# Patient Record
Sex: MALE | Race: WHITE | NOT HISPANIC OR LATINO | Employment: FULL TIME | URBAN - METROPOLITAN AREA
[De-identification: names, ages, dates, MRNs, and addresses within clinical notes are randomized per-mention and may not be internally consistent; named-entity substitution may affect disease eponyms.]

---

## 2023-01-28 PROBLEM — Z91.09 ALLERGY TO MOLD SPORES: Status: ACTIVE | Noted: 2023-01-28

## 2023-01-28 PROBLEM — M72.2 PLANTAR FASCIITIS, LEFT: Status: ACTIVE | Noted: 2023-01-28

## 2023-01-28 PROBLEM — J30.1 ALLERGIC RHINITIS DUE TO POLLEN: Status: ACTIVE | Noted: 2023-01-28

## 2023-01-28 PROBLEM — K21.9 ESOPHAGEAL REFLUX: Status: ACTIVE | Noted: 2023-01-28

## 2023-01-28 PROBLEM — E66.812 CLASS 2 SEVERE OBESITY DUE TO EXCESS CALORIES WITH SERIOUS COMORBIDITY AND BODY MASS INDEX (BMI) OF 37.0 TO 37.9 IN ADULT: Status: ACTIVE | Noted: 2023-01-28

## 2023-01-28 PROBLEM — H69.93 DYSFUNCTION OF BOTH EUSTACHIAN TUBES: Status: ACTIVE | Noted: 2023-01-28

## 2023-01-28 PROBLEM — E78.5 DYSLIPIDEMIA: Status: ACTIVE | Noted: 2023-01-28

## 2023-01-28 PROBLEM — E66.01 CLASS 2 SEVERE OBESITY DUE TO EXCESS CALORIES WITH SERIOUS COMORBIDITY AND BODY MASS INDEX (BMI) OF 37.0 TO 37.9 IN ADULT (MULTI): Status: ACTIVE | Noted: 2023-01-28

## 2023-01-28 PROBLEM — L30.9 ECZEMA OF BOTH HANDS: Status: ACTIVE | Noted: 2023-01-28

## 2023-01-28 PROBLEM — J45.909 ALLERGIC ASTHMA (HHS-HCC): Status: ACTIVE | Noted: 2023-01-28

## 2023-01-28 PROBLEM — R74.01 ALT (SGPT) LEVEL RAISED: Status: ACTIVE | Noted: 2023-01-28

## 2023-01-28 PROBLEM — M72.2 PLANTAR FASCIITIS, RIGHT: Status: ACTIVE | Noted: 2023-01-28

## 2023-01-28 PROBLEM — J30.81 ALLERGY TO ANIMAL DANDER: Status: ACTIVE | Noted: 2023-01-28

## 2023-01-28 PROBLEM — L91.8 SKIN TAG: Status: ACTIVE | Noted: 2023-01-28

## 2023-01-28 PROBLEM — F90.0 ADHD, PREDOMINANTLY INATTENTIVE TYPE: Status: ACTIVE | Noted: 2023-01-28

## 2023-01-28 PROBLEM — I10 ESSENTIAL HYPERTENSION WITH GOAL BLOOD PRESSURE LESS THAN 130/85: Status: ACTIVE | Noted: 2023-01-28

## 2023-01-28 RX ORDER — LOSARTAN POTASSIUM 100 MG/1
1 TABLET ORAL DAILY
COMMUNITY
Start: 2021-09-09 | End: 2023-06-19

## 2023-01-28 RX ORDER — CETIRIZINE HYDROCHLORIDE 10 MG/1
1 TABLET ORAL NIGHTLY
COMMUNITY
Start: 2015-07-24

## 2023-01-28 RX ORDER — METHYLPHENIDATE HYDROCHLORIDE 27 MG/1
TABLET ORAL
COMMUNITY
Start: 2022-10-19 | End: 2024-03-21 | Stop reason: SDUPTHER

## 2023-01-28 RX ORDER — ALBUTEROL SULFATE 90 UG/1
2 AEROSOL, METERED RESPIRATORY (INHALATION) EVERY 4 HOURS PRN
COMMUNITY
Start: 2019-03-19

## 2023-01-28 RX ORDER — MONTELUKAST SODIUM 10 MG/1
1 TABLET ORAL NIGHTLY
COMMUNITY
Start: 2016-10-18 | End: 2024-05-31

## 2023-03-16 ENCOUNTER — OFFICE VISIT (OUTPATIENT)
Dept: PRIMARY CARE | Facility: CLINIC | Age: 33
End: 2023-03-16
Payer: COMMERCIAL

## 2023-03-16 ENCOUNTER — LAB (OUTPATIENT)
Dept: LAB | Facility: LAB | Age: 33
End: 2023-03-16
Payer: COMMERCIAL

## 2023-03-16 VITALS
OXYGEN SATURATION: 97 % | DIASTOLIC BLOOD PRESSURE: 90 MMHG | SYSTOLIC BLOOD PRESSURE: 164 MMHG | BODY MASS INDEX: 38.86 KG/M2 | RESPIRATION RATE: 16 BRPM | TEMPERATURE: 98.1 F | HEART RATE: 75 BPM | HEIGHT: 73 IN | WEIGHT: 293.2 LBS

## 2023-03-16 DIAGNOSIS — E78.5 DYSLIPIDEMIA: ICD-10-CM

## 2023-03-16 DIAGNOSIS — I10 ESSENTIAL HYPERTENSION WITH GOAL BLOOD PRESSURE LESS THAN 130/85: Primary | Chronic | ICD-10-CM

## 2023-03-16 DIAGNOSIS — J45.20 MILD INTERMITTENT EXTRINSIC ASTHMA WITHOUT COMPLICATION (HHS-HCC): Chronic | ICD-10-CM

## 2023-03-16 DIAGNOSIS — I10 ESSENTIAL HYPERTENSION WITH GOAL BLOOD PRESSURE LESS THAN 130/85: Chronic | ICD-10-CM

## 2023-03-16 DIAGNOSIS — A05.8: ICD-10-CM

## 2023-03-16 DIAGNOSIS — F90.0 ADHD, PREDOMINANTLY INATTENTIVE TYPE: Chronic | ICD-10-CM

## 2023-03-16 DIAGNOSIS — K21.9 GASTROESOPHAGEAL REFLUX DISEASE WITHOUT ESOPHAGITIS: Chronic | ICD-10-CM

## 2023-03-16 DIAGNOSIS — J30.1 SEASONAL ALLERGIC RHINITIS DUE TO POLLEN: Chronic | ICD-10-CM

## 2023-03-16 DIAGNOSIS — Z56.6 STRESS AT WORK: Chronic | ICD-10-CM

## 2023-03-16 LAB
ALANINE AMINOTRANSFERASE (SGPT) (U/L) IN SER/PLAS: 44 U/L (ref 10–52)
ALBUMIN (G/DL) IN SER/PLAS: 4.3 G/DL (ref 3.4–5)
ALKALINE PHOSPHATASE (U/L) IN SER/PLAS: 78 U/L (ref 33–120)
ANION GAP IN SER/PLAS: 12 MMOL/L (ref 10–20)
ASPARTATE AMINOTRANSFERASE (SGOT) (U/L) IN SER/PLAS: 32 U/L (ref 9–39)
BILIRUBIN TOTAL (MG/DL) IN SER/PLAS: 0.4 MG/DL (ref 0–1.2)
CALCIUM (MG/DL) IN SER/PLAS: 9.3 MG/DL (ref 8.6–10.3)
CARBON DIOXIDE, TOTAL (MMOL/L) IN SER/PLAS: 25 MMOL/L (ref 21–32)
CHLORIDE (MMOL/L) IN SER/PLAS: 104 MMOL/L (ref 98–107)
CHOLESTEROL (MG/DL) IN SER/PLAS: 213 MG/DL (ref 0–199)
CHOLESTEROL IN HDL (MG/DL) IN SER/PLAS: 41.2 MG/DL
CHOLESTEROL/HDL RATIO: 5.2
CREATININE (MG/DL) IN SER/PLAS: 0.87 MG/DL (ref 0.5–1.3)
GFR MALE: >90 ML/MIN/1.73M2
GLUCOSE (MG/DL) IN SER/PLAS: 103 MG/DL (ref 74–99)
LDL: 126 MG/DL (ref 0–99)
NON HDL CHOLESTEROL: 172 MG/DL
POTASSIUM (MMOL/L) IN SER/PLAS: 4.1 MMOL/L (ref 3.5–5.3)
PROTEIN TOTAL: 7.3 G/DL (ref 6.4–8.2)
SODIUM (MMOL/L) IN SER/PLAS: 137 MMOL/L (ref 136–145)
THYROTROPIN (MIU/L) IN SER/PLAS BY DETECTION LIMIT <= 0.05 MIU/L: 1.65 MIU/L (ref 0.44–3.98)
TRIGLYCERIDE (MG/DL) IN SER/PLAS: 229 MG/DL (ref 0–149)
UREA NITROGEN (MG/DL) IN SER/PLAS: 13 MG/DL (ref 6–23)
VLDL: 46 MG/DL (ref 0–40)

## 2023-03-16 PROCEDURE — 3077F SYST BP >= 140 MM HG: CPT | Performed by: INTERNAL MEDICINE

## 2023-03-16 PROCEDURE — 3080F DIAST BP >= 90 MM HG: CPT | Performed by: INTERNAL MEDICINE

## 2023-03-16 PROCEDURE — 99214 OFFICE O/P EST MOD 30 MIN: CPT | Performed by: INTERNAL MEDICINE

## 2023-03-16 PROCEDURE — 36415 COLL VENOUS BLD VENIPUNCTURE: CPT

## 2023-03-16 PROCEDURE — 84443 ASSAY THYROID STIM HORMONE: CPT

## 2023-03-16 PROCEDURE — 1036F TOBACCO NON-USER: CPT | Performed by: INTERNAL MEDICINE

## 2023-03-16 PROCEDURE — 80053 COMPREHEN METABOLIC PANEL: CPT

## 2023-03-16 PROCEDURE — 80061 LIPID PANEL: CPT

## 2023-03-16 RX ORDER — KETOCONAZOLE 20 MG/G
CREAM TOPICAL
COMMUNITY
Start: 2022-04-12

## 2023-03-16 RX ORDER — AZITHROMYCIN 500 MG/1
500 TABLET, FILM COATED ORAL DAILY
Qty: 3 TABLET | Refills: 0 | COMMUNITY
Start: 2022-09-14 | End: 2022-09-17

## 2023-03-16 RX ORDER — HYDROCORTISONE 25 MG/G
CREAM TOPICAL
COMMUNITY
Start: 2022-04-12

## 2023-03-16 SDOH — HEALTH STABILITY - MENTAL HEALTH: OTHER PHYSICAL AND MENTAL STRAIN RELATED TO WORK: Z56.6

## 2023-03-16 ASSESSMENT — PATIENT HEALTH QUESTIONNAIRE - PHQ9
1. LITTLE INTEREST OR PLEASURE IN DOING THINGS: NOT AT ALL
SUM OF ALL RESPONSES TO PHQ9 QUESTIONS 1 AND 2: 0
2. FEELING DOWN, DEPRESSED OR HOPELESS: NOT AT ALL

## 2023-03-16 ASSESSMENT — ENCOUNTER SYMPTOMS
OCCASIONAL FEELINGS OF UNSTEADINESS: 0
LOSS OF SENSATION IN FEET: 0
DEPRESSION: 0

## 2023-03-16 NOTE — PROGRESS NOTES
"Subjective   Patient ID: Roosevelt Cartagena is a 32 y.o. male who presents for Follow-up.    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  Its been a very stressful 2 weeks.  He has not been able to get to the gym because there is afterschool meetings         Review of Systems    Objective   /90 (BP Location: Left arm, Patient Position: Sitting)   Pulse 75   Temp 36.7 °C (98.1 °F)   Resp 16   Ht 1.854 m (6' 1\")   Wt 133 kg (293 lb 3.2 oz)   SpO2 97%   BMI 38.68 kg/m²     Physical Exam  Vitals reviewed.   Constitutional:       Appearance: Normal appearance. He is obese.   HENT:      Head: Normocephalic and atraumatic.   Eyes:      General: No scleral icterus.        Right eye: No discharge.         Left eye: No discharge.      Extraocular Movements: Extraocular movements intact.      Conjunctiva/sclera: Conjunctivae normal.      Pupils: Pupils are equal, round, and reactive to light.   Cardiovascular:      Rate and Rhythm: Normal rate and regular rhythm.      Pulses: Normal pulses.      Heart sounds: Normal heart sounds. No murmur heard.  Pulmonary:      Effort: Pulmonary effort is normal.      Breath sounds: Normal breath sounds. No wheezing or rhonchi.   Musculoskeletal:         General: No deformity or signs of injury. Normal range of motion.      Cervical back: Normal range of motion and neck supple. No rigidity or tenderness.   Lymphadenopathy:      Cervical: No cervical adenopathy.   Skin:     General: Skin is warm and dry.      Findings: No rash.   Neurological:      General: No focal deficit present.      Mental Status: He is alert and oriented to person, place, and time. Mental status is at baseline.      Cranial Nerves: No cranial nerve deficit.      Sensory: No sensory deficit.      Gait: Gait normal.   Psychiatric:   "       Mood and Affect: Mood normal.         Behavior: Behavior normal.         Thought Content: Thought content normal.         Judgment: Judgment normal.         Assessment/Plan   Problem List Items Addressed This Visit          Respiratory    Allergic asthma       Circulatory    Essential hypertension with goal blood pressure less than 130/85 - Primary    Relevant Orders    Comprehensive Metabolic Panel (Completed)    Follow Up In [facility] - PCP       Digestive    Esophageal reflux       Other    Stress at work (Chronic)    ADHD, predominantly inattentive type    Allergic rhinitis due to pollen    Dyslipidemia    Relevant Orders    Lipid Panel (Completed)    TSH with reflex to Free T4 if abnormal (Completed)     Other Visit Diagnoses       Campylobacter jejuni food poisoning                   Work stress-unfortunately there is been cut back 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  This all came to light within the last week or so.  Its been very stressful involving afternoon meetings.  Support provided.  Fortunately he has been reassured that he will be able to maintain his job    Elevated blood pressure/elevated weight/dyslipidemia-he will continue to follow his blood pressure at home with the machine that found to be fairly accurate.  He will continue exercise plan - presently at [facility] - now 2 x wk     Overweight with elevated BMI over 25- long-term lifestyle measures including optimizing diet and fitness routine are important has been emphasized.  He has utilized calorie counting apps, and understands the importance of balancing optimal eating and aerobic fitness      Asthma has improved presently he remains on montelukast. Mainly needs inhaler before exercise         Allergy plan-he will continue his monthly shots with Dr. Hardin, as well as his medications. Encouraged him not to get another cat  at this time until his symptoms have been stabilized/improved.   Doing well presently. Monthly allergy shots continue. He will continue his annually. last appt 10/22     ADHD- he is back on Concerta- under the direction of Dr. Olvin Lubin at [facility].  Quarterly visits continue     Hx recurrent bilateral plantar fasciitis/feet pain-He's been thru PT, and has seen his podiatrist, Dr. Renee now prn. Inserts from [employer] very helpful.      Coffee intolerance-he will continue avoid this as he seems to have palpitations and chills with this. Interesting negative allergy evaluation for coffee     Acid reflux-not problematic     Snoring/sleeplessness- Reports that he is unable to fall asleep at nights. Snoring after drinking alcohol. Encouraged him to do a sleep study in the past. He will call with concerns. 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 I discussed how his asthma and allergies could also be combined with the eczema changes. He may be evolving towards dyshidrotic eczema picture. We'll review at follow-up. not active issue.     Skin - follows up with Dermatology. Recently saw Dr. Crump for groin skin tag.     Dental care-encouraged semiannual dental visits. last visit 7/22.     Vision appt. -updated every year or so. Will f/u with any vision changes or concerns.

## 2023-03-17 NOTE — RESULT ENCOUNTER NOTE
Roosevelt - thank you for coming in for your visit, and for doing the labs.  I am glad that labs look stable without any worrisome findings.  The kidney, liver and thyroid labs look fine as do the electrolytes.      The fasting glucose is slightly elevated.  Finally the cholesterol panel shows mixed results.  Though the total cholesterol and LDL/bad cholesterol are a bit better, the HDL/good cholesterol and triglyceride level are very worse.  We will continue to follow these down the road.    Wishing you all the best.    Anthony Read MD none known

## 2023-06-17 DIAGNOSIS — I10 ESSENTIAL (PRIMARY) HYPERTENSION: ICD-10-CM

## 2023-06-19 RX ORDER — LOSARTAN POTASSIUM 100 MG/1
TABLET ORAL
Qty: 90 TABLET | Refills: 3 | Status: SHIPPED | OUTPATIENT
Start: 2023-06-19 | End: 2024-02-05

## 2023-09-21 ENCOUNTER — OFFICE VISIT (OUTPATIENT)
Dept: PRIMARY CARE | Facility: CLINIC | Age: 33
End: 2023-09-21
Payer: COMMERCIAL

## 2023-09-21 VITALS
SYSTOLIC BLOOD PRESSURE: 124 MMHG | HEIGHT: 73 IN | RESPIRATION RATE: 16 BRPM | TEMPERATURE: 98.1 F | WEIGHT: 278 LBS | OXYGEN SATURATION: 96 % | BODY MASS INDEX: 36.84 KG/M2 | DIASTOLIC BLOOD PRESSURE: 84 MMHG | HEART RATE: 76 BPM

## 2023-09-21 DIAGNOSIS — Z23 NEED FOR INFLUENZA VACCINATION: ICD-10-CM

## 2023-09-21 DIAGNOSIS — L98.9 SKIN LESION: Primary | ICD-10-CM

## 2023-09-21 PROBLEM — L91.8 OTHER HYPERTROPHIC DISORDERS OF THE SKIN: Status: ACTIVE | Noted: 2022-03-15

## 2023-09-21 PROBLEM — L30.4 ERYTHEMA INTERTRIGO: Status: ACTIVE | Noted: 2022-03-15

## 2023-09-21 PROCEDURE — 3079F DIAST BP 80-89 MM HG: CPT | Performed by: INTERNAL MEDICINE

## 2023-09-21 PROCEDURE — 1036F TOBACCO NON-USER: CPT | Performed by: INTERNAL MEDICINE

## 2023-09-21 PROCEDURE — 90686 IIV4 VACC NO PRSV 0.5 ML IM: CPT | Performed by: INTERNAL MEDICINE

## 2023-09-21 PROCEDURE — 3074F SYST BP LT 130 MM HG: CPT | Performed by: INTERNAL MEDICINE

## 2023-09-21 PROCEDURE — 90471 IMMUNIZATION ADMIN: CPT | Performed by: INTERNAL MEDICINE

## 2023-09-21 PROCEDURE — 99395 PREV VISIT EST AGE 18-39: CPT | Performed by: INTERNAL MEDICINE

## 2023-09-21 ASSESSMENT — ENCOUNTER SYMPTOMS
LOSS OF SENSATION IN FEET: 0
OCCASIONAL FEELINGS OF UNSTEADINESS: 0
DEPRESSION: 0
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
## 2023-10-24 ENCOUNTER — CLINICAL SUPPORT (OUTPATIENT)
Dept: ALLERGY | Facility: CLINIC | Age: 33
End: 2023-10-24
Payer: COMMERCIAL

## 2023-10-24 DIAGNOSIS — J30.1 ALLERGIC RHINITIS DUE TO POLLEN, UNSPECIFIED SEASONALITY: ICD-10-CM

## 2023-10-24 PROCEDURE — 95117 IMMUNOTHERAPY INJECTIONS: CPT | Performed by: ALLERGY & IMMUNOLOGY

## 2023-11-28 ENCOUNTER — CLINICAL SUPPORT (OUTPATIENT)
Dept: ALLERGY | Facility: CLINIC | Age: 33
End: 2023-11-28
Payer: COMMERCIAL

## 2023-11-28 DIAGNOSIS — J30.9 ALLERGIC RHINITIS, UNSPECIFIED SEASONALITY, UNSPECIFIED TRIGGER: ICD-10-CM

## 2023-11-28 PROCEDURE — 95117 IMMUNOTHERAPY INJECTIONS: CPT | Performed by: ALLERGY & IMMUNOLOGY

## 2023-12-19 ENCOUNTER — CLINICAL SUPPORT (OUTPATIENT)
Dept: ALLERGY | Facility: CLINIC | Age: 33
End: 2023-12-19
Payer: COMMERCIAL

## 2023-12-19 DIAGNOSIS — J30.9 ALLERGIC RHINITIS, UNSPECIFIED SEASONALITY, UNSPECIFIED TRIGGER: ICD-10-CM

## 2023-12-19 PROCEDURE — 95117 IMMUNOTHERAPY INJECTIONS: CPT | Performed by: ALLERGY & IMMUNOLOGY

## 2024-01-16 ENCOUNTER — CLINICAL SUPPORT (OUTPATIENT)
Dept: ALLERGY | Facility: CLINIC | Age: 34
End: 2024-01-16
Payer: COMMERCIAL

## 2024-01-16 DIAGNOSIS — J30.9 ALLERGIC RHINITIS, UNSPECIFIED SEASONALITY, UNSPECIFIED TRIGGER: ICD-10-CM

## 2024-01-16 PROCEDURE — 95117 IMMUNOTHERAPY INJECTIONS: CPT | Performed by: ALLERGY & IMMUNOLOGY

## 2024-02-05 ENCOUNTER — OFFICE VISIT (OUTPATIENT)
Dept: PRIMARY CARE | Facility: CLINIC | Age: 34
End: 2024-02-05
Payer: COMMERCIAL

## 2024-02-05 DIAGNOSIS — K21.9 GASTROESOPHAGEAL REFLUX DISEASE WITHOUT ESOPHAGITIS: ICD-10-CM

## 2024-02-05 DIAGNOSIS — F41.9 ANXIETY: ICD-10-CM

## 2024-02-05 DIAGNOSIS — Z56.6 STRESS AT WORK: Chronic | ICD-10-CM

## 2024-02-05 DIAGNOSIS — J45.20 MILD INTERMITTENT EXTRINSIC ASTHMA WITHOUT COMPLICATION (HHS-HCC): ICD-10-CM

## 2024-02-05 DIAGNOSIS — E78.5 DYSLIPIDEMIA: ICD-10-CM

## 2024-02-05 DIAGNOSIS — R10.13 DYSPEPSIA: ICD-10-CM

## 2024-02-05 DIAGNOSIS — J30.1 ALLERGIC RHINITIS DUE TO POLLEN, UNSPECIFIED SEASONALITY: Chronic | ICD-10-CM

## 2024-02-05 DIAGNOSIS — I10 ESSENTIAL HYPERTENSION WITH GOAL BLOOD PRESSURE LESS THAN 130/85: Primary | Chronic | ICD-10-CM

## 2024-02-05 DIAGNOSIS — M72.2 PLANTAR FASCIITIS, RIGHT: ICD-10-CM

## 2024-02-05 PROCEDURE — 1036F TOBACCO NON-USER: CPT | Performed by: INTERNAL MEDICINE

## 2024-02-05 PROCEDURE — 99214 OFFICE O/P EST MOD 30 MIN: CPT | Performed by: INTERNAL MEDICINE

## 2024-02-05 PROCEDURE — 3074F SYST BP LT 130 MM HG: CPT | Performed by: INTERNAL MEDICINE

## 2024-02-05 PROCEDURE — 3080F DIAST BP >= 90 MM HG: CPT | Performed by: INTERNAL MEDICINE

## 2024-02-05 RX ORDER — PANTOPRAZOLE SODIUM 40 MG/1
40 TABLET, DELAYED RELEASE ORAL 2 TIMES DAILY
Qty: 60 TABLET | Refills: 1 | Status: SHIPPED | OUTPATIENT
Start: 2024-02-05 | End: 2024-02-28

## 2024-02-05 RX ORDER — AMLODIPINE AND OLMESARTAN MEDOXOMIL 5; 20 MG/1; MG/1
1 TABLET ORAL DAILY
Qty: 30 TABLET | Refills: 11 | Status: SHIPPED | OUTPATIENT
Start: 2024-02-05 | End: 2024-03-21 | Stop reason: DRUGHIGH

## 2024-02-05 SDOH — HEALTH STABILITY - MENTAL HEALTH: OTHER PHYSICAL AND MENTAL STRAIN RELATED TO WORK: Z56.6

## 2024-02-05 ASSESSMENT — ENCOUNTER SYMPTOMS
DEPRESSION: 0
OCCASIONAL FEELINGS OF UNSTEADINESS: 0
LOSS OF SENSATION IN FEET: 0

## 2024-02-05 ASSESSMENT — ANXIETY QUESTIONNAIRES
1. FEELING NERVOUS, ANXIOUS, OR ON EDGE: MORE THAN HALF THE DAYS
GAD7 TOTAL SCORE: 14
3. WORRYING TOO MUCH ABOUT DIFFERENT THINGS: MORE THAN HALF THE DAYS
6. BECOMING EASILY ANNOYED OR IRRITABLE: MORE THAN HALF THE DAYS
5. BEING SO RESTLESS THAT IT IS HARD TO SIT STILL: NOT AT ALL
2. NOT BEING ABLE TO STOP OR CONTROL WORRYING: NEARLY EVERY DAY
7. FEELING AFRAID AS IF SOMETHING AWFUL MIGHT HAPPEN: MORE THAN HALF THE DAYS
IF YOU CHECKED OFF ANY PROBLEMS ON THIS QUESTIONNAIRE, HOW DIFFICULT HAVE THESE PROBLEMS MADE IT FOR YOU TO DO YOUR WORK, TAKE CARE OF THINGS AT HOME, OR GET ALONG WITH OTHER PEOPLE: SOMEWHAT DIFFICULT
4. TROUBLE RELAXING: NEARLY EVERY DAY

## 2024-02-05 NOTE — PROGRESS NOTES
"Subjective   Patient ID: Roosevelt Cartagena is a 33 y.o. male who presents for GERD.    Here for follow-up sooner than scheduled.    Last week he was at a school convention in [place], admittedly eating and drinking more and staying out late, seeing friends he had not seen for some time.  Unfortunately his reflux has been much worse.  Even eating simple things this morning caused reflux.  He has been using Tums and Pepto-Bismol and began omeprazole/Prilosec OTC 20 mg the last couple days    He has had more anxiousness.  Its become a bit free-floating he states.  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  He is still a bit frustrated after a good friend became somewhat estranged last year.  This still has not reconciled itself.    His plantar fasciitis fortunately has improved wearing barefoot shoes he states         Review of Systems    Objective   BP (!) 128/94   Pulse 77   Temp 36.8 °C (98.3 °F)   Resp 16   Ht 1.842 m (6' 0.5\")   Wt 133 kg (292 lb 6.4 oz)   SpO2 93%   BMI 39.11 kg/m²     Physical Exam  Constitutional:       Appearance: Normal appearance.   HENT:      Head: Normocephalic and atraumatic.      Right Ear: Tympanic membrane normal.      Nose: Nose normal.   Eyes:      General: No scleral icterus.     Extraocular Movements: Extraocular movements intact.      Conjunctiva/sclera: Conjunctivae normal.      Pupils: Pupils are equal, round, and reactive to light.   Cardiovascular:      Rate and Rhythm: Normal rate and regular rhythm.      Pulses: Normal pulses.      Heart sounds: Normal heart sounds. No murmur heard.  Pulmonary:      Effort: Pulmonary effort is normal. No respiratory distress.      Breath sounds: Normal breath sounds. No stridor. No wheezing.   Abdominal:      General: Abdomen is flat. Bowel sounds are normal. There is no distension.      Palpations: Abdomen is soft. " There is no mass.      Tenderness: There is no abdominal tenderness. There is no guarding.      Comments: No guarding or rebound tenderness but he did note some of the discomfort in the epigastric area   Musculoskeletal:         General: No swelling, tenderness or deformity. Normal range of motion.      Cervical back: Normal range of motion and neck supple. No tenderness.   Lymphadenopathy:      Cervical: No cervical adenopathy.   Skin:     General: Skin is warm and dry.      Findings: No lesion or rash.   Neurological:      General: No focal deficit present.      Mental Status: He is alert and oriented to person, place, and time.      Cranial Nerves: No cranial nerve deficit.      Motor: No weakness.   Psychiatric:         Mood and Affect: Mood normal.         Behavior: Behavior normal.         Thought Content: Thought content normal.         Judgment: Judgment normal.         Assessment/Plan   Problem List Items Addressed This Visit             ICD-10-CM    Allergic asthma J45.909    Allergic rhinitis due to pollen J30.1    Dyslipidemia E78.5    Relevant Orders    TSH with reflex to Free T4 if abnormal    Esophageal reflux K21.9    Relevant Medications    pantoprazole (ProtoNix) 40 mg EC tablet    Other Relevant Orders    Referral to Gastroenterology    Follow Up In [facility] - PCP - Established    Essential hypertension with goal blood pressure less than 130/85 - Primary I10    Relevant Medications    amlodipine-olmesartan (Erich) 5-20 mg tablet    Other Relevant Orders    CBC    Comprehensive Metabolic Panel    Plantar fasciitis, right M72.2    Stress at work (Chronic) Z56.6    Anxiety F41.9    Relevant Orders    Follow Up In [facility] - Behavioral Health Collaborative Care CoCM    Dyspepsia R10.13    Relevant Medications    pantoprazole (ProtoNix) 40 mg EC tablet    Other Relevant Orders    Referral to Gastroenterology    Follow Up In [facility] - PCP - Established    TSH with  reflex to Free T4 if abnormal    CBC    Comprehensive Metabolic Panel        Acid reflux-2/24-much worse recently-despite Tums and Prilosec OTC.  I suggested changing to pantoprazole twice daily.  He will review handout on reflux and flatus provided-and optimize lifestyle accordingly and also meet with GI.        Elevated blood pressure/elevated weight/dyslipidemia-he will continue to follow his blood pressure at home with the machine that found to be fairly accurate.            2/24-diastolic pressure elevated into the 90s despite losartan 100 mg daily.  He did not tolerate a diuretic before with urinary frequency.  Will change from losartan to olmesartan/amlodipine 5/20.  He will follow his blood pressure and return in 6 weeks to review his BP diary.  He will do a CMP over the next 2 to 3 weeks      Exercise routine-he will continue exercise plan - presently at [facility] - now 2 x wk     Overweight with elevated BMI over 30- long-term lifestyle measures including optimizing diet and fitness routine are important has been emphasized.  He has utilized calorie counting apps, and understands the importance of balancing optimal eating and aerobic fitness             2/24-BMI 39.11 he will continue his lifestyle efforts as he is able to including diet changes and exercise as above.     Asthma has improved presently he remains on montelukast. Mainly needs inhaler before exercise         Allergy plan-he will continue his monthly shots with Dr. Hardin, as well as his medications. Encouraged him not to get another cat at this time until his symptoms have been stabilized/improved.   Doing well presently. Monthly allergy shots continue. He will continue his annually. last appt 10/22     ADHD- he is back on Concerta- under the direction of Dr. Olvin Lubin at [facility].  Quarterly visits continue     Hx recurrent bilateral plantar fasciitis/feet pain-He's been thru PT, and has seen his podiatrist, Dr. Renee  now prn. Inserts from [employer] very helpful.            He is now wearing barefoot shoes which he feels helps a lot      Coffee intolerance-he will continue avoid this as he seems to have palpitations and chills with this. Interesting negative allergy evaluation for coffee     Acid reflux-not problematic     Snoring/sleeplessness- Reports that he is unable to fall asleep at nights. Snoring after drinking alcohol. Encouraged him to do a sleep study in the past. He will call with concerns. 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 I discussed how his asthma and allergies could also be combined with the eczema changes. He may be evolving towards dyshidrotic eczema picture. We'll review at follow-up. not active issue.     Skin - follows up with Dermatology. Recently saw Dr. Crump for groin skin tag.        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  This all came to light within the last week or so.  Its been very stressful involving afternoon meetings.  Support provided.  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    Anxiety-in the past situational but now a bit more free-floating, worse early 2024, in part triggered by worsening reflux, his job assignment change as above, and the estrangement last year with a good friend which she still has not figured out.  He will continue his exercise routine.  I suggested he meet with our counselor Mechelle.  He was thinking about considering a counselor and would be happy to meet with her accordingly.     WILLIAM 7 score = 14 on 2/5/24      I have discussed the collaborative care model for this patient's behavioral health care. Written detailed information and identifying the members of this care team was provided to patient. They give permission for the Behavioral Health Manager (BHM) and psychiatric consultant to be included in their care with my continued primary management. Patient made aware that services provided as part of the Collaborative Care Model are subject to cost sharing.             Dental care-encouraged semiannual dental visits. last visit 7/22.     Vision appt. -updated every year or so. Will f/u with any vision changes or concerns.

## 2024-02-06 VITALS
HEART RATE: 77 BPM | BODY MASS INDEX: 38.75 KG/M2 | WEIGHT: 292.4 LBS | OXYGEN SATURATION: 93 % | DIASTOLIC BLOOD PRESSURE: 94 MMHG | SYSTOLIC BLOOD PRESSURE: 128 MMHG | HEIGHT: 73 IN | TEMPERATURE: 98.3 F | RESPIRATION RATE: 16 BRPM

## 2024-02-06 PROBLEM — F41.9 ANXIETY: Status: ACTIVE | Noted: 2024-02-06

## 2024-02-06 PROBLEM — R10.13 DYSPEPSIA: Status: ACTIVE | Noted: 2024-02-06

## 2024-02-13 ENCOUNTER — CLINICAL SUPPORT (OUTPATIENT)
Dept: ALLERGY | Facility: CLINIC | Age: 34
End: 2024-02-13
Payer: COMMERCIAL

## 2024-02-13 DIAGNOSIS — J30.2 SEASONAL ALLERGIES: ICD-10-CM

## 2024-02-13 PROCEDURE — 95117 IMMUNOTHERAPY INJECTIONS: CPT | Performed by: ALLERGY & IMMUNOLOGY

## 2024-02-20 ENCOUNTER — LAB (OUTPATIENT)
Dept: LAB | Facility: LAB | Age: 34
End: 2024-02-20
Payer: COMMERCIAL

## 2024-02-20 DIAGNOSIS — R10.13 DYSPEPSIA: ICD-10-CM

## 2024-02-20 DIAGNOSIS — E78.5 DYSLIPIDEMIA: ICD-10-CM

## 2024-02-20 DIAGNOSIS — I10 ESSENTIAL HYPERTENSION WITH GOAL BLOOD PRESSURE LESS THAN 130/85: Chronic | ICD-10-CM

## 2024-02-20 LAB
ALBUMIN SERPL BCP-MCNC: 4.8 G/DL (ref 3.4–5)
ALP SERPL-CCNC: 81 U/L (ref 33–120)
ALT SERPL W P-5'-P-CCNC: 43 U/L (ref 10–52)
ANION GAP SERPL CALC-SCNC: 13 MMOL/L (ref 10–20)
AST SERPL W P-5'-P-CCNC: 22 U/L (ref 9–39)
BILIRUB SERPL-MCNC: 0.3 MG/DL (ref 0–1.2)
BUN SERPL-MCNC: 12 MG/DL (ref 6–23)
CALCIUM SERPL-MCNC: 9.6 MG/DL (ref 8.6–10.3)
CHLORIDE SERPL-SCNC: 105 MMOL/L (ref 98–107)
CO2 SERPL-SCNC: 25 MMOL/L (ref 21–32)
CREAT SERPL-MCNC: 0.92 MG/DL (ref 0.5–1.3)
EGFRCR SERPLBLD CKD-EPI 2021: >90 ML/MIN/1.73M*2
ERYTHROCYTE [DISTWIDTH] IN BLOOD BY AUTOMATED COUNT: 11.8 % (ref 11.5–14.5)
GLUCOSE SERPL-MCNC: 97 MG/DL (ref 74–99)
HCT VFR BLD AUTO: 47.5 % (ref 41–52)
HGB BLD-MCNC: 16 G/DL (ref 13.5–17.5)
MCH RBC QN AUTO: 29.4 PG (ref 26–34)
MCHC RBC AUTO-ENTMCNC: 33.7 G/DL (ref 32–36)
MCV RBC AUTO: 87 FL (ref 80–100)
NRBC BLD-RTO: 0 /100 WBCS (ref 0–0)
PLATELET # BLD AUTO: 283 X10*3/UL (ref 150–450)
POTASSIUM SERPL-SCNC: 3.9 MMOL/L (ref 3.5–5.3)
PROT SERPL-MCNC: 7.3 G/DL (ref 6.4–8.2)
RBC # BLD AUTO: 5.45 X10*6/UL (ref 4.5–5.9)
SODIUM SERPL-SCNC: 139 MMOL/L (ref 136–145)
TSH SERPL-ACNC: 2.06 MIU/L (ref 0.44–3.98)
WBC # BLD AUTO: 8.2 X10*3/UL (ref 4.4–11.3)

## 2024-02-20 PROCEDURE — 85027 COMPLETE CBC AUTOMATED: CPT

## 2024-02-20 PROCEDURE — 84443 ASSAY THYROID STIM HORMONE: CPT

## 2024-02-20 PROCEDURE — 80053 COMPREHEN METABOLIC PANEL: CPT

## 2024-02-20 PROCEDURE — 36415 COLL VENOUS BLD VENIPUNCTURE: CPT

## 2024-02-21 NOTE — RESULT ENCOUNTER NOTE
Roosevelt    Thank you for doing the labs.  I am glad that the results look good without any worrisome findings.    Sincerely,  Anthony Read MD

## 2024-02-26 ENCOUNTER — SOCIAL WORK (OUTPATIENT)
Dept: PRIMARY CARE | Facility: CLINIC | Age: 34
End: 2024-02-26
Payer: COMMERCIAL

## 2024-02-26 NOTE — PROGRESS NOTES
Collaborative Care ([facility]) Initial Assessment    Session Time  Start: 11:05 AM  End: 12:32 PM     Collaborative Care program information (including case discussion with psychiatry, involvement of [facility] and billing when applicable) was provided and discussed with the patient. Patient Indicated understanding and agreed to proceed.   Confirm: Yes    Patient Health Questionnaire-9 Score: 18 (2/28/2024  2:18 PM)  WILLIAM-7 Total Score: 15 (2/28/2024  2:18 PM)        Reason for Visit / Chief Complaint  Chief Complaint   Patient presents with    Anxiety    Initial Assessment        Accompanied by: Self  Guardian Status: Self  Caregiver Status: Does not have a caregiver    Review of Symptoms    Sleep   Average Hours Sleep in/Night: 6  Prepares Self for Sleep at Time: Pt reported he does not have a consistent bedtime (between 10:30 PM and midnight). Pt reported he has always had trouble sleeping since he was a small child.  Usual Wake up Time: 6:45 AM but wakes up at 5:00 AM feeling anxious and usually cannot fall back to sleep.  Sleep Symptoms: difficulty falling asleep, asleep in 1-2 hours, interrupted sleep, awake all night, daytime sleepiness, grinds teeth in sleep, waking up anxious, and awakes feeling exhausted  Sleep Hygiene: no sleep rituals    Mood   Symptom Onset/Duration: Last 1-2 years  Current Sx: little interest/pleasure doing things, feeling down, feeling depressed, feeling hopeless, trouble falling asleep, trouble staying asleep, feeling tired/little energy, low motivation, poor appetite, weight gain, feeling bad about self, feeling like failure, trouble concentrating, fidgety/restless, anger/irritability, isolating from others, low self-esteem, and thoughts better off dead- Pt reported he had SI in September. Pt reported he does not actually want to die and that these thoughts scared him very much. Pt denies history of self harm and denies history of suicide attempts/ hospitalizations.   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 COVID pandemic   Current Sx: feeling nervous/anxious/on edge, difficulty stopping/controlling worry, worrying too much, trouble relaxing, feeling fidgety/restless, easily annoyed/irritable, trouble concentrating, afraid something awful may happen, negative thought of self, racing thoughts, avoidance, rumination, panic attack(s), and somatic symptoms  Panic / Somatic Sx: rapid heartbeat, sweating, nausea/vomiting, shaking/jitters, dry mouth, and muscle tension acid reflux, high blood pressure  Triggers: situation(s)   Past Sx: feeling nervous/anxious/on edge, difficulty stopping/controlling worry, trouble relaxing, feeling fidgety/restless, trouble concentrating, and racing thoughts- pt attributes this to ADHD diagnosis and difficulty in school    Self-Esteem / Self-Image   Self Esteem Rating (1-10 Scale, 10 being high): 3  Self-Esteem / Self Image Sx: sensitive to criticism, struggles with confidence, feels inferior to others, feels like a failure, feels useless at times, feels unworthy, compares self to others, judges self, negative self-talk, self-doubt, and imposter syndrome    Appetite   Description of Overall Appetite: decreased appetite  Eating Behaviors: binge eats  Concerns with appetite: feels overweight    Anger / Irritability  Symptoms of Anger / Irritability: suppresses anger, verbal anger, feeling guilty, and easily agitated     Communication / Self Expression  Communication Style & Concerns: assertive, able to express self/emotions, extroverted, doesn't actively listen, interrupts others a lot, making assumptions, and difficulty accepting constructive feedback- Pt reported he struggles with \"people pleasing\"     Trauma      Traumatic Experiences: none, denied    Abuse History  Physical Abuse: No  Sexual Abuse: No  Verbal / Emotional Abuse / Bullying (+Cyber): No   Financial Abuse: No  Domestic Violence: " No    Grief / Loss / Adjustment   Symptom Onset/Duration: 6 months or less  Current Sx: depressed mood, feeling hopeless, feeling emotionally overwhelmed, anxious mood, changes/problems with sleep, feeling shocked/numb, changes in relationships, problems with work, difficulty functioning in daily activities, and suicidal thoughts/behavior  Factors of Grief / Loss / Adjustment: new job/position and loss of friend(s)  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 Pt really loved working in his previous school and has had difficulty since transferring. Pt reported he had a falling out with a very close friend that has been causing him distress.    Hallucinations / Delusions   Hallucinations & Delusions Experienced: none, denied    Learning Concerns / Memory   Learning Concerns & Sx: trouble with focus and concentrating, difficulty paying attention, diagnosis of ADHD/ADD, and fidgety  Memory Concerns & Sx: none, denied    Functional impairment   Impacting ADL's: no impairment   Impacting IADL's: No impairment  Impacting Ability : to relax, to focus/concentrate, to sleep, and to be present    Associated Medical Concerns   Potential Associated Factors:  GERD        Comprehensive Behavioral Health History     Medications  Current Mental Health Medications:   Lexapro / escitalopram; Dose: 10 mg (increase to 20 mg in 7 days if tolerated); Side effects: None, denied  Concerta; Dose: 27 mg; Side effects: dry mouth    Past Mental Health Medications:   None/Unknown    Concerns / challenges / barriers with taking medications? No concerns    Open to medication recommendations from consulting psychiatrist? No- currently established with a psychiatrist through [facility]    Do you ever forget to take your medication? Yes  If yes, how often? Has jordana that helps him remember to take meds. He only takes Concerta during  work days.    Mental Health Treatment History  Mental Health Treatment: hx of seeing psychiatrist and family therapy- Pt is currently established with [facility] psychiatrist  Reason/When/Where/Outcome:     Risk History  Suicidal Thoughts/Method/Intent/Plan: passive suicidal thoughts  Suicide Attempts/Preparations: None, denied  Number of Suicide Attempts: 0  Access to Firearms/Lethal Means: no access to firearms/lethal means  Non-Suicidal Self Injury: None, denied  Last Page Risk Score:    Protective Factors: strong protective factors, hopefulness/future orientation, generative employment, fear of suicide, and marriage/partnership    Violence: None, denied  Homicidal Thoughts/Method/Plan/Intent: None, denied  Homicidal Attempts/Preparations: None, denied  Number of Attempts: 0      Substance Use History    Substances    Social History     Substance and Sexual Activity   Alcohol Use Yes    Alcohol/week: 7.0 - 10.0 standard drinks of alcohol    Types: 7 - 10 Standard drinks or equivalent per week     Social History     Substance and Sexual Activity   Drug Use Never       Substance Current Use   Alcohol Minimal use   Marijuana Nightly/ has helped with anxiety symptoms               Addiction Treatment     Types of Addiction Treatment: Denies    Family History    Mental Health / Conditions    Family Member Condition / Diagnosis Medications / Side Effects   Mother Anxiety disorder- suspected undiagnosed and untreated    Father ADHD/ADD- suspected undiagnosed and untreated                Substance Use    Family Member Substance Current Use   Father Alcohol Minimal use   Grandfather;  paternal Alcohol                   History of Suicide    Family Member Details   N/A            Social History    Housing   Living Situation: lives with spouse, lives in house, and has pets  Safe Housing Conditions / Feels Safe in Home: Yes    Employment  Current Employment: full-time  Current Concerns/Challenges:  "No    Income   Current Concerns/Challenges: No  Receive Benefits/Assistance: No    Education   Status / Level of Education: Master's degree    Legal   Legal Considerations: None, denied    Relationships   S/O:   and finds spouse supportive   Parents/Guardian: Sees his parents monthly, close supportive relationship with them   Siblings: 2 siblings, has close supportive relationship with siblings  Friends: Pt reported he has close supportive network of friends       Active Duty? No  Are you a ? 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 \"Working through adjustments with career, working through conflict with friend, coping skills to manage anxiety symptoms, boundaries, people pleasing .\"    Plan:   bi-weekly, provide psycho-education, and provide appropriate tx referrals    Follow up in 8 days (on 3/5/2024) for Next scheduled follow-up.    Provisional Findings / Impressions  Primary: ADHD    Secondary: Generalized Anxiety Disorder, F41.1    Goals    Care Plan    There is no care plan documentation to display.       "

## 2024-02-28 DIAGNOSIS — R10.13 DYSPEPSIA: ICD-10-CM

## 2024-02-28 DIAGNOSIS — K21.9 GASTROESOPHAGEAL REFLUX DISEASE WITHOUT ESOPHAGITIS: ICD-10-CM

## 2024-02-28 RX ORDER — PANTOPRAZOLE SODIUM 40 MG/1
40 TABLET, DELAYED RELEASE ORAL 2 TIMES DAILY
Qty: 180 TABLET | Refills: 3 | Status: SHIPPED | OUTPATIENT
Start: 2024-02-28 | End: 2025-02-27

## 2024-02-28 ASSESSMENT — ANXIETY QUESTIONNAIRES
GAD7 TOTAL SCORE: 15
1. FEELING NERVOUS, ANXIOUS, OR ON EDGE: NEARLY EVERY DAY
IF YOU CHECKED OFF ANY PROBLEMS ON THIS QUESTIONNAIRE, HOW DIFFICULT HAVE THESE PROBLEMS MADE IT FOR YOU TO DO YOUR WORK, TAKE CARE OF THINGS AT HOME, OR GET ALONG WITH OTHER PEOPLE: SOMEWHAT DIFFICULT
3. WORRYING TOO MUCH ABOUT DIFFERENT THINGS: NEARLY EVERY DAY
5. BEING SO RESTLESS THAT IT IS HARD TO SIT STILL: SEVERAL DAYS
4. TROUBLE RELAXING: MORE THAN HALF THE DAYS
7. FEELING AFRAID AS IF SOMETHING AWFUL MIGHT HAPPEN: NEARLY EVERY DAY
6. BECOMING EASILY ANNOYED OR IRRITABLE: SEVERAL DAYS
2. NOT BEING ABLE TO STOP OR CONTROL WORRYING: MORE THAN HALF THE DAYS

## 2024-02-28 ASSESSMENT — PATIENT HEALTH QUESTIONNAIRE - PHQ9
1. LITTLE INTEREST OR PLEASURE IN DOING THINGS: SEVERAL DAYS
7. TROUBLE CONCENTRATING ON THINGS, SUCH AS READING THE NEWSPAPER OR WATCHING TELEVISION: MORE THAN HALF THE DAYS
2. FEELING DOWN, DEPRESSED OR HOPELESS: MORE THAN HALF THE DAYS
SUM OF ALL RESPONSES TO PHQ9 QUESTIONS 1 & 2: 3
8. MOVING OR SPEAKING SO SLOWLY THAT OTHER PEOPLE COULD HAVE NOTICED. OR THE OPPOSITE, BEING SO FIGETY OR RESTLESS THAT YOU HAVE BEEN MOVING AROUND A LOT MORE THAN USUAL: MORE THAN HALF THE DAYS
6. FEELING BAD ABOUT YOURSELF - OR THAT YOU ARE A FAILURE OR HAVE LET YOURSELF OR YOUR FAMILY DOWN: MORE THAN HALF THE DAYS
5. POOR APPETITE OR OVEREATING: NEARLY EVERY DAY
10. IF YOU CHECKED OFF ANY PROBLEMS, HOW DIFFICULT HAVE THESE PROBLEMS MADE IT FOR YOU TO DO YOUR WORK, TAKE CARE OF THINGS AT HOME, OR GET ALONG WITH OTHER PEOPLE: SOMEWHAT DIFFICULT
9. THOUGHTS THAT YOU WOULD BE BETTER OFF DEAD, OR OF HURTING YOURSELF: NOT AT ALL
4. FEELING TIRED OR HAVING LITTLE ENERGY: NEARLY EVERY DAY
SUM OF ALL RESPONSES TO PHQ QUESTIONS 1-9: 18
3. TROUBLE FALLING OR STAYING ASLEEP: NEARLY EVERY DAY

## 2024-02-29 ENCOUNTER — DOCUMENTATION (OUTPATIENT)
Dept: BEHAVIORAL HEALTH | Facility: CLINIC | Age: 34
End: 2024-02-29
Payer: COMMERCIAL

## 2024-02-29 NOTE — PROGRESS NOTES
[facility] Psychiatry Consult Note     Roosevelt Cartagena is a 33 y.o., referred to Collaborative Care for symptoms of depression and anxiety. I have reviewed the patient with the behavioral health manager and reviewed the patient's electronic record.    Recommendations:   - will ask patient to choose between psychiatrist and therapist at [facility] versus collaborative care here  - if staying here, [facility] will do ACT for anxiety and continue to assess for less likely but possible OCD (would affect med recommendations below)  - GERD could very much have a functional dyspepsia component that may resolve with anxiety improvement - consider PPI taper trial once anxiety improves  - fun fact, asthma attacks are also less frequent when anxiety is reduced  ADHD:   - it's reasonable to continue Concerta as it was very helpful and marijuana use is mild and only at night, will not be affecting executive function during the day  Anxiety:  - certainly reasonable to continue titrating Lexapro to effect  - If no improvement, taper off and try titrating an SNRI to effect: Duloxetine  - If partial improvement in either case, consider augmentation with pregabalin. If no improvement in either case, replace with pregabalin.  - If no improvement with pregabalin, taper and replace with buspirone.  Insomnia:   - this is related to anxiety and may resolve with anxiety treatment as above  - however, other medications now may help sleep improve and end dependence on marijuana  - would consider low-dose quetiapine nightly, which will also help with anxiety  - could also attempt trazodone or hydroxyzine  - in any case, may be able to taper off once daytime anxiety is under control    Diagnosis: ADHD and WILLIAM with panic attacks, worse in the context of new work stress, also with MDD (but secondary to anxiety)  Meds: Concerta 27 mg daily (very helpful) and Lexapro 10 mg daily, just started  No previous medications  Uses marijuana - a couple puffs at night to  help calm down and sleep  Had strong SI in September but this really scared him    Patient Health Questionnaire-9 Score: 18 (2/28/2024  2:18 PM)  WILILAM-7 Total Score: 15 (2/28/2024  2:18 PM)    Sertraline (Zoloft)  DOSING INFORMATION:   Consider BMP for baseline sodium in older adults.   Start at 25 mg daily. If well-tolerated after 1 week, increase to 50 mg daily.  Titrate to effect by 25-50 mg every two weeks, to a maximum dose of 200 mg daily.  Discontinuation: 25% per week to 25% per month depending on length of treatment in order to minimize withdrawal symptoms and relapse.  OF NOTE: False-positive urine immunoassay screening tests for benzodiazepines have been reported in patients taking sertraline.  GENERAL INFORMATION:   Mechanism of Action: Selective serotonin reuptake inhibitor.   FDA Indications: MDD, OCD, panic disorder, PTSD, social phobia, PMDD.   Off-Label Indications: Other anxiety.   Pharmacokinetics: T½ = 26 hrs.   Common Side effects (MDD): Nausea (26%), diarrhea (18%), dry mouth (16%), insomnia (16%), somnolence (13%), dizziness (12%), tremor (11%), fatigue (11%), increased sweating, (8%), ejaculation failure (7%).   Reproductive potential/pregnancy/lactation: Usual first-line SSRI antidepressant during pregnancy and lactation.  Black Box Warning: Increased SI in patients < 26 y/o. This is based on initial pharmaceutical studies and has not been borne out in subsequent meta-analyses.   Rare/Serious Adverse Effects: Clinical worsening and suicide risk, hypomanic/manic switch, serotonin symptoms, weight loss, seizure, discontinuation symptoms, abnormal bleeding, altered platelet function, hyponatremia, weak uricosuric effect, angle closure glaucoma.   Contraindications: Use of a MAOI within 4 weeks; Concomitant use with pimozide.     Duloxetine (Cymbalta)  ==================  DOSING INFORMATION  Week 1: Baseline weight and blood pressure. BMP for baseline sodium in older adults. Start: 30 mg  qday.  Week 2: Increase dose to the Initial and Typical Target Dose of 60 mg qday or 30 mg bid, if tolerated.   Max Dose: 120 mg qday (little evidence that higher doses are beneficial).   Discontinuation: 25% per week to 25% per month depending on length of treatment in order to minimize withdrawal symptoms and relapse.  MONITORING: Blood pressure, weight. Posttreatment BMP to rule out hyponatremia in older adults.  GENERAL INFORMATION:   Mechanism of Action: Serotonin/Norepinephrine Reuptake Inhibitor (SNRI).   FDA Indications: MDD, WILLIAM, diabetic peripheral neuropathic pain, fibromyalgia; chronic musculoskeletal pain.   Off-Label Indications: Second-line ADHD, other pain, other anxiety.  Pharmacokinetics: T½ = 12 hrs.   Common Side effects (MDD & WILLIAM): nausea (25%), dry mouth (15%), diarrhea (10%), constipation (10%), fatigue (10%), dizziness (10%), somnolence (10%), insomnia (10%), decreased appetite (7%), hyperhidrosis (6%), vomiting (5%), agitation (5%).  Black Box Warning: Increased SI in patients < 26 y/o.   Contraindications:Use of a MAOI within 14 days of stopping Cymbalta, concurrent use of a MAOI including drugs with significant MAOI activity (e.g., linezolid), use of Cymbalta within 14 days of stopping a MAOI, use in patients with uncontrolled narrow angle glaucoma. Warnings and Precautions: Suicidality, hepatotoxicity (should not be prescribed in patients with substantial alcohol use or evidence of chronic liver disease), orthostatic hypotension and syncope, serotonin syndrome, abnormal bleeding, severe skin reactions, discontinuation symptoms, manic switch, seizures, increased BP, use with 1A2 inhibitors or thioridazine, hyponatremia, hepatic insufficiency and severe renal impairment, use caution in patient with controlled narrow-angle glaucoma and with slow gastric emptying, elevation in fasting blood glucose and HbA1C, urinary hesitance and retention. Metabolism/ Pharmacogenomics: Metabolized by 1A2  and 2D6.   Significant drug-drug interactions: 2D6 inhibitor. Avoid co-administration with potent 1A2 inhibitors (e.g., fluvoxamine); and use cautiously with 2D6 inhibitors (e.g., fluoxetine and paroxetine). Potential for abnormal bleeding with NSAIDs or anticoagulants; Check all drug-drug interactions before prescribing.   Dosage Form: Capsule (Do not cut, crush or chew).    Pregabalin (Lyrica)  ================  - has effects for anxiety reportedly comparable to benzodiazepines, but clinical experience varies.  - Dosin mg/day in 2 to 3 divided doses; may increase based on response and tolerability at weekly intervals in increments of 150 mg/day up to a usual dose of 300 mg/day. May further increase up to 600 mg/day; however, additional benefit of doses >300 mg/day is uncertain  - AEs can include neuro (sedation, dizziness, tremor, irritability); dry mouth; blurred vision, and peripheral edema.  - The  does report potential weight gain, but this was 5 pounds over the course of two years among patients with advanced diabetes (neuropathy).  - Pregabalin does cross the placenta, but studies of effects on pregnancy are too limited to see any effect and research is ongoing.  - Pregabalin does have abuse potential, primarily for patients with pre-existing substance use disorders, and use in that population must be cautious and entail documentation of risks and benefits.    Buspirone  =========  Check baseline sodium in older adults or concomitant diuretic use.   DOSING  Week 1: Start at 7.5 mg twice daily  Week 2: Increase to 15 mg twice daily, if tolerated.  Week 4 onward: Consider further increases as needed and tolerated, up to a maximum of 30 mg twice daily.  Time frame for improvement similar to SSRIs and other antidepressants.  DISCONTINUATION: Taper slowly to minimize withdrawal symptoms.  MONITORING: Check sodium after 3-4 weeks at target dose in older adults or concomitant diuretic use.    MECHANISM: Not specifically known; high affinity for serotonin (5-HT1A) receptors and moderate affinity for dopamine (D2) receptors. Not related to benzodiazepines and does not affect WEI binding.   INDICATIONS  FDA Indications: Anxiety   Other Indications: Depression augmentation, reversing SSRI/SNRI induced sexual dysfunction.   ADVERSE EFFECTS  Common Side effects (Anxiety): Dizziness (12%), nausea (8%), headache (6%), nervousness (5%).   Black Box Warning: None.  Warnings/Precautions: Use of a MAOI within 14 days of stopping BuSpar, concurrent use of a MAOI including drugs with significant MAOI activity (e.g., linezolid), or use of BuSpar within 14 days of stopping a MAOI, use in patients with severe hepatic or renal impairment, potential restlessness syndrome (e.g., akathisia). Metabolism/Pharmacogenomics: Metabolized by CY.   DRUG INTERACTIONS: Use cautiously when co-administered with potent 3A inhibitors (e.g., ketoconazole and protease inhibitors) and enzyme inducers (e.g., carbamazepine and H[place]atonic's wort); Avoid grapefruit juice.    Quetiapine (Seroquel)  ==================  DOSING INFORMATION:   Assess baseline weight, waist circumference, blood pressure, fasting plasma glucose, fasting lipid profile, CBC (for baseline WBC), EKG (to assess QTc) and AIMS test.   Initiation for depression augmentation or anxiety monotherapy: Start with 25 mg at bedtime and increase by up to 50 mg weekly to a target dose of  mg per day, in divided doses.  ONGOING MONITORING:   EKG at target dose (at least once to assess QTc). At 4 weeks: Weight, Fasting lipid profile.   At 8 weeks: weight.   At 12 weeks: weight, blood pressure, fasting plasma glucose, fasting lipid profile.   Quarterly thereafter: weight.   Annually /ongoing: Waist circumference, weight, blood pressure, fasting plasma glucose, fasting lipid profile and AIMS test. Consider checking for cataracts.  GENERAL INFORMATION: Atypical antipsychotic.    FDA Indications: Schizophrenia (IR, XR), Bipolar I - manic (IR, XR), Bipolar I - mixed (XR), Bipolar disorder - depressive episode (IR, XR), Bipolar maintenance as adjunctive to lithium or divalproex (IR, XR), Adjunctive treatment of MDD (XR).   Off-Label Indications: Anxiety disorders augmentation.   Pharmacokinetics: T½ = 6 hr (IR); 7 hrs (XR).   Common Side Effects (Schizophrenia and Bipolar Susy-Seroquel IR): Headache (21%), somnolence (18%), dizziness (11%), dry mouth (9%), constipation (8%), weight gain (5%), dyspepsia (5%), ALT increased (5%).   Common Side Effects (Bipolar Depression-Seroquel XR): Somnolence (52%), dry mouth (37%), Increased appetite (12%), dyspepsia (7%), weight gain (7%), fatigue (6%). Black Box Warnings: (1) Increased mortality in elderly patients with dementia related psychosis. (2) Increased risk of suicidal thinking and behavior in children, adolescents, and young adults taking antidepressants. (3) Monitor for worsening and emergence of suicidal thoughts and behaviors.       The above treatment considerations and suggestions are based on consultations with the patient's care manager and a review of information available in the electronic medical record. I have not personally examined the patient. All recommendations should be implemented with consideration of the patient's relevant prior history and current clinical status. Please feel free to call me with any questions about the care of this patient. I can easily be reached through [employer] Chat.

## 2024-03-01 ENCOUNTER — DOCUMENTATION (OUTPATIENT)
Dept: PRIMARY CARE | Facility: CLINIC | Age: 34
End: 2024-03-01
Payer: COMMERCIAL

## 2024-03-01 DIAGNOSIS — F41.1 GAD (GENERALIZED ANXIETY DISORDER): Primary | ICD-10-CM

## 2024-03-01 PROBLEM — F41.9 ANXIETY: Status: RESOLVED | Noted: 2024-02-06 | Resolved: 2024-03-01

## 2024-03-01 PROCEDURE — 99494 1ST/SBSQ PSYC COLLAB CARE: CPT

## 2024-03-01 PROCEDURE — 99492 1ST PSYC COLLAB CARE MGMT: CPT

## 2024-03-05 ENCOUNTER — SOCIAL WORK (OUTPATIENT)
Dept: PRIMARY CARE | Facility: CLINIC | Age: 34
End: 2024-03-05
Payer: COMMERCIAL

## 2024-03-06 NOTE — PROGRESS NOTES
Collaborative Care ([facility])  Progress Note    Type of Interaction: In Office    Start Time: 4:12 PM    End Time: 5:15 PM        Appointment: Scheduled    Reason for Visit:   Chief Complaint   Patient presents with    Anxiety    Follow-up    ADHD          Interval History / Patient Symptoms:     Patient Health Questionnaire-9 Score: 18 (2/28/2024  2:18 PM)  WILLIAM-7 Total Score: 15 (2/28/2024  2:18 PM)    M met with pt for scheduled office visit. M reviewed psych consult recommendations with pt. Pt was in agreement with recommendation. M will bridge care until pt is established with traditional psychotherapist, since he is already established with psychiatry and does not wish to terminate care with them at this time. Pt reported having difficulty with executive functioning and processed that this has had a negative impact on his self esteem because he feels stuck in those moments. Pt processed current stressors at work and stated he feels on edge and has negative thoughts that he is not doing a good enough job. [facility] provided psychoeducation regarding effective coping skills for managing anxiety/ ADHD symptoms.     Interventions Provided: Psychoeducation, Acceptance & Commitment Therapy, Develop Coping Strategies, and Mindfulness      Progress Made: Minimum    Response to Intervention: Pt was engaged throughout session and was receptive towards [facility] feedback and interventions provided.     Plan:     -Pt will utilize a daily planner in order to stay on task with his daily routine and assist with executive functioning  -Pt will complete informal mindfulness homework to review during next session  -Follow up with [facility] in 2 weeks    Follow Up / Next Appointment: Next appointment: 03/20/24

## 2024-03-11 ENCOUNTER — CLINICAL SUPPORT (OUTPATIENT)
Dept: ALLERGY | Facility: CLINIC | Age: 34
End: 2024-03-11
Payer: COMMERCIAL

## 2024-03-11 DIAGNOSIS — J30.2 SEASONAL ALLERGIES: ICD-10-CM

## 2024-03-11 PROCEDURE — 95117 IMMUNOTHERAPY INJECTIONS: CPT | Performed by: ALLERGY & IMMUNOLOGY

## 2024-03-12 ENCOUNTER — APPOINTMENT (OUTPATIENT)
Dept: ALLERGY | Facility: CLINIC | Age: 34
End: 2024-03-12
Payer: COMMERCIAL

## 2024-03-20 ENCOUNTER — SOCIAL WORK (OUTPATIENT)
Dept: PRIMARY CARE | Facility: CLINIC | Age: 34
End: 2024-03-20
Payer: COMMERCIAL

## 2024-03-20 NOTE — PROGRESS NOTES
[facility] ([facility])  Progress Note    Type of Interaction: In Office    Start Time: 3:36 PM    End Time: 4:50 PM        Appointment: Scheduled    Reason for Visit:   Chief Complaint   Patient presents with    Anxiety    ADHD    Follow-up          Interval History / Patient Symptoms:     Patient Health Questionnaire-9 Score: 13 (3/21/2024 12:43 PM)  WILLIAM-7 Total Score: 9 (3/21/2024 12:44 PM)    [facility] met with pt for scheduled office visit. Pt reported he has had a great week, as he recently returned from a trip to [place] with his wife. Pt reported he discontinued Lexapro because of sexual side effects, as he and his wife are trying to conceive. Pt reported his psychiatrist prescribed Mirtazapine 15 mg for anxiety symptoms which he started on 3/17/24. [facility] provided psychoeducation regarding medication side effects, proper use, etc. [facility] explained Collaborative Care services to pt. [facility] explained that since his medication is already managed by a psychiatrist and pt wishes to continue with the psychiatrist, he cannot continue within Collaborative Care. [facility] provided pt referrals for therapists for long term treatment. [facility] will bridge pt care until he is established with new therapist. Pt voiced understanding and agreed with plan. Pt processed current stressors at his workplace. Pt reported he does not want to stay in his current position, so he has not made attempts at finding solutions to the issues he is facing. 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 [facility] challenged pt thought distortions throughout session and assisted pt with cognitive restructuring.     Interventions Provided: Communication Training, Develop Coping Strategies, and CBT      Progress Made: Moderate    Response to Intervention: Pt was engaged throughout session and was receptive towards [facility] feedback and interventions provided.       Plan:     -Pt will  utilize coping skills learned in session in his daily routine to assist with ADHD related symptoms   -Pt will research and schedule initial assessment with long term therapist  -Follow up with M in 2 weeks    Follow Up / Next Appointment: Next appointment: 04/03/24

## 2024-03-21 ENCOUNTER — OFFICE VISIT (OUTPATIENT)
Dept: PRIMARY CARE | Facility: CLINIC | Age: 34
End: 2024-03-21
Payer: COMMERCIAL

## 2024-03-21 DIAGNOSIS — Z56.6 STRESS AT WORK: Chronic | ICD-10-CM

## 2024-03-21 DIAGNOSIS — K21.9 GASTROESOPHAGEAL REFLUX DISEASE WITHOUT ESOPHAGITIS: ICD-10-CM

## 2024-03-21 DIAGNOSIS — Z91.09 ALLERGY TO MOLD SPORES: ICD-10-CM

## 2024-03-21 DIAGNOSIS — R10.13 DYSPEPSIA: ICD-10-CM

## 2024-03-21 DIAGNOSIS — I10 ESSENTIAL HYPERTENSION WITH GOAL BLOOD PRESSURE LESS THAN 130/85: Chronic | ICD-10-CM

## 2024-03-21 DIAGNOSIS — F90.0 ADHD, PREDOMINANTLY INATTENTIVE TYPE: ICD-10-CM

## 2024-03-21 DIAGNOSIS — E78.5 DYSLIPIDEMIA: Primary | ICD-10-CM

## 2024-03-21 PROCEDURE — 3079F DIAST BP 80-89 MM HG: CPT | Performed by: INTERNAL MEDICINE

## 2024-03-21 PROCEDURE — 3075F SYST BP GE 130 - 139MM HG: CPT | Performed by: INTERNAL MEDICINE

## 2024-03-21 PROCEDURE — 1036F TOBACCO NON-USER: CPT | Performed by: INTERNAL MEDICINE

## 2024-03-21 PROCEDURE — 99214 OFFICE O/P EST MOD 30 MIN: CPT | Performed by: INTERNAL MEDICINE

## 2024-03-21 RX ORDER — METHYLPHENIDATE HYDROCHLORIDE 36 MG/1
TABLET ORAL
COMMUNITY
Start: 2024-03-21

## 2024-03-21 RX ORDER — MIRTAZAPINE 15 MG/1
15 TABLET, FILM COATED ORAL NIGHTLY
COMMUNITY
Start: 2024-03-12

## 2024-03-21 RX ORDER — AMLODIPINE AND OLMESARTAN MEDOXOMIL 5; 20 MG/1; MG/1
1 TABLET ORAL DAILY
Qty: 30 TABLET | Refills: 11 | Status: SHIPPED | COMMUNITY
Start: 2024-03-21 | End: 2024-04-08 | Stop reason: SDUPTHER

## 2024-03-21 RX ORDER — ESCITALOPRAM OXALATE 20 MG/1
TABLET ORAL
COMMUNITY
Start: 2024-02-27 | End: 2024-03-21 | Stop reason: SINTOL

## 2024-03-21 SDOH — HEALTH STABILITY - MENTAL HEALTH: OTHER PHYSICAL AND MENTAL STRAIN RELATED TO WORK: Z56.6

## 2024-03-21 ASSESSMENT — PATIENT HEALTH QUESTIONNAIRE - PHQ9
9. THOUGHTS THAT YOU WOULD BE BETTER OFF DEAD, OR OF HURTING YOURSELF: NOT AT ALL
1. LITTLE INTEREST OR PLEASURE IN DOING THINGS: SEVERAL DAYS
SUM OF ALL RESPONSES TO PHQ9 QUESTIONS 1 & 2: 2
6. FEELING BAD ABOUT YOURSELF - OR THAT YOU ARE A FAILURE OR HAVE LET YOURSELF OR YOUR FAMILY DOWN: SEVERAL DAYS
10. IF YOU CHECKED OFF ANY PROBLEMS, HOW DIFFICULT HAVE THESE PROBLEMS MADE IT FOR YOU TO DO YOUR WORK, TAKE CARE OF THINGS AT HOME, OR GET ALONG WITH OTHER PEOPLE: VERY DIFFICULT
1. LITTLE INTEREST OR PLEASURE IN DOING THINGS: NOT AT ALL
3. TROUBLE FALLING OR STAYING ASLEEP: NEARLY EVERY DAY
4. FEELING TIRED OR HAVING LITTLE ENERGY: NEARLY EVERY DAY
7. TROUBLE CONCENTRATING ON THINGS, SUCH AS READING THE NEWSPAPER OR WATCHING TELEVISION: MORE THAN HALF THE DAYS
2. FEELING DOWN, DEPRESSED OR HOPELESS: SEVERAL DAYS
8. MOVING OR SPEAKING SO SLOWLY THAT OTHER PEOPLE COULD HAVE NOTICED. OR THE OPPOSITE, BEING SO FIGETY OR RESTLESS THAT YOU HAVE BEEN MOVING AROUND A LOT MORE THAN USUAL: NOT AT ALL
2. FEELING DOWN, DEPRESSED OR HOPELESS: NOT AT ALL
5. POOR APPETITE OR OVEREATING: MORE THAN HALF THE DAYS
SUM OF ALL RESPONSES TO PHQ QUESTIONS 1-9: 13
SUM OF ALL RESPONSES TO PHQ9 QUESTIONS 1 AND 2: 0

## 2024-03-21 ASSESSMENT — ANXIETY QUESTIONNAIRES
IF YOU CHECKED OFF ANY PROBLEMS ON THIS QUESTIONNAIRE, HOW DIFFICULT HAVE THESE PROBLEMS MADE IT FOR YOU TO DO YOUR WORK, TAKE CARE OF THINGS AT HOME, OR GET ALONG WITH OTHER PEOPLE: SOMEWHAT DIFFICULT
6. BECOMING EASILY ANNOYED OR IRRITABLE: NEARLY EVERY DAY
2. NOT BEING ABLE TO STOP OR CONTROL WORRYING: SEVERAL DAYS
3. WORRYING TOO MUCH ABOUT DIFFERENT THINGS: NEARLY EVERY DAY
5. BEING SO RESTLESS THAT IT IS HARD TO SIT STILL: NOT AT ALL
7. FEELING AFRAID AS IF SOMETHING AWFUL MIGHT HAPPEN: NOT AT ALL
GAD7 TOTAL SCORE: 9
1. FEELING NERVOUS, ANXIOUS, OR ON EDGE: SEVERAL DAYS
4. TROUBLE RELAXING: SEVERAL DAYS

## 2024-03-21 ASSESSMENT — ENCOUNTER SYMPTOMS
LOSS OF SENSATION IN FEET: 0
OCCASIONAL FEELINGS OF UNSTEADINESS: 0
DEPRESSION: 0
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
## 2024-03-22 VITALS
RESPIRATION RATE: 16 BRPM | BODY MASS INDEX: 38.09 KG/M2 | HEART RATE: 81 BPM | WEIGHT: 287.4 LBS | TEMPERATURE: 97.9 F | SYSTOLIC BLOOD PRESSURE: 130 MMHG | HEIGHT: 73 IN | OXYGEN SATURATION: 96 % | DIASTOLIC BLOOD PRESSURE: 80 MMHG

## 2024-03-22 PROBLEM — R74.01 ALT (SGPT) LEVEL RAISED: Status: RESOLVED | Noted: 2023-01-28 | Resolved: 2024-03-22

## 2024-03-29 ENCOUNTER — DOCUMENTATION (OUTPATIENT)
Dept: PRIMARY CARE | Facility: CLINIC | Age: 34
End: 2024-03-29
Payer: COMMERCIAL

## 2024-03-29 DIAGNOSIS — F41.1 GAD (GENERALIZED ANXIETY DISORDER): Primary | ICD-10-CM

## 2024-03-29 PROCEDURE — 99493 SBSQ PSYC COLLAB CARE MGMT: CPT | Performed by: INTERNAL MEDICINE

## 2024-03-29 PROCEDURE — 99494 1ST/SBSQ PSYC COLLAB CARE: CPT | Performed by: INTERNAL MEDICINE

## 2024-04-03 ENCOUNTER — SOCIAL WORK (OUTPATIENT)
Dept: PRIMARY CARE | Facility: CLINIC | Age: 34
End: 2024-04-03
Payer: COMMERCIAL

## 2024-04-03 NOTE — PROGRESS NOTES
Collaborative Care ([facility])  Progress Note    Type of Interaction: In Office    Start Time: 4:02 PM    End Time: 5:15 PM        Appointment: Scheduled    Reason for Visit:   Chief Complaint   Patient presents with    Anxiety    ADHD    Follow-up          Interval History / Patient Symptoms:     Patient Health Questionnaire-9 Score: 12 (4/5/2024  1:34 PM)  WILLIAM-7 Total Score: 11 (4/5/2024  1:35 PM)    [facility] met with pt for scheduled office visit. Pt reported he has been feeling pretty tired most days and it is challenging for him to get up and get going in the morning. Pt reported he tried taking Mirtazepine earlier last night which helped him get a good night sleep so that he felt more energized this morning. 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 [facility] discussed sleep hygiene with pt and encouraged pt to be consistent with his bedtime. Pt reported he was productive this week and completed tasks around his house that he has been procrastinating. Pt reported decrease in THC use within the last 2 weeks. Pt reported he feels a lot of shame that he has such difficulty with his ADHD symptoms. Pt reported he has had trouble with sticking to a routine and being consistent since COVID-19 pandemic. [facility] challenged pt thought distortions throughout session and assisted pt with cognitive restructuring. Pt reported he has not scheduled an initial appointment with new therapist yet, but plans to schedule something this week.     Interventions Provided: Psychoeducation, Acceptance & Commitment Therapy, and Develop Coping Strategies      Progress Made: Minimum    Response to Intervention: Pt was engaged throughout session and was receptive towards [facility] feedback and interventions provided.     Plan:     -Pt will call to schedule initial appointment with therapist from [facility]  -Follow up with [facility] in 2 weeks     Follow Up / Next Appointment: Next appointment:  04/18/24

## 2024-04-05 ASSESSMENT — PATIENT HEALTH QUESTIONNAIRE - PHQ9
4. FEELING TIRED OR HAVING LITTLE ENERGY: NEARLY EVERY DAY
2. FEELING DOWN, DEPRESSED OR HOPELESS: SEVERAL DAYS
7. TROUBLE CONCENTRATING ON THINGS, SUCH AS READING THE NEWSPAPER OR WATCHING TELEVISION: SEVERAL DAYS
8. MOVING OR SPEAKING SO SLOWLY THAT OTHER PEOPLE COULD HAVE NOTICED. OR THE OPPOSITE, BEING SO FIGETY OR RESTLESS THAT YOU HAVE BEEN MOVING AROUND A LOT MORE THAN USUAL: NOT AT ALL
SUM OF ALL RESPONSES TO PHQ9 QUESTIONS 1 & 2: 2
5. POOR APPETITE OR OVEREATING: MORE THAN HALF THE DAYS
3. TROUBLE FALLING OR STAYING ASLEEP: SEVERAL DAYS
1. LITTLE INTEREST OR PLEASURE IN DOING THINGS: SEVERAL DAYS
9. THOUGHTS THAT YOU WOULD BE BETTER OFF DEAD, OR OF HURTING YOURSELF: NOT AT ALL
6. FEELING BAD ABOUT YOURSELF - OR THAT YOU ARE A FAILURE OR HAVE LET YOURSELF OR YOUR FAMILY DOWN: NEARLY EVERY DAY
10. IF YOU CHECKED OFF ANY PROBLEMS, HOW DIFFICULT HAVE THESE PROBLEMS MADE IT FOR YOU TO DO YOUR WORK, TAKE CARE OF THINGS AT HOME, OR GET ALONG WITH OTHER PEOPLE: SOMEWHAT DIFFICULT
SUM OF ALL RESPONSES TO PHQ QUESTIONS 1-9: 12

## 2024-04-05 ASSESSMENT — ANXIETY QUESTIONNAIRES
2. NOT BEING ABLE TO STOP OR CONTROL WORRYING: MORE THAN HALF THE DAYS
IF YOU CHECKED OFF ANY PROBLEMS ON THIS QUESTIONNAIRE, HOW DIFFICULT HAVE THESE PROBLEMS MADE IT FOR YOU TO DO YOUR WORK, TAKE CARE OF THINGS AT HOME, OR GET ALONG WITH OTHER PEOPLE: SOMEWHAT DIFFICULT
3. WORRYING TOO MUCH ABOUT DIFFERENT THINGS: MORE THAN HALF THE DAYS
GAD7 TOTAL SCORE: 11
4. TROUBLE RELAXING: SEVERAL DAYS
7. FEELING AFRAID AS IF SOMETHING AWFUL MIGHT HAPPEN: SEVERAL DAYS
5. BEING SO RESTLESS THAT IT IS HARD TO SIT STILL: SEVERAL DAYS
1. FEELING NERVOUS, ANXIOUS, OR ON EDGE: SEVERAL DAYS
6. BECOMING EASILY ANNOYED OR IRRITABLE: NEARLY EVERY DAY

## 2024-04-08 DIAGNOSIS — I10 ESSENTIAL HYPERTENSION WITH GOAL BLOOD PRESSURE LESS THAN 130/85: Chronic | ICD-10-CM

## 2024-04-08 RX ORDER — AMLODIPINE AND OLMESARTAN MEDOXOMIL 5; 20 MG/1; MG/1
1 TABLET ORAL DAILY
Qty: 90 TABLET | Refills: 3 | Status: SHIPPED | OUTPATIENT
Start: 2024-04-08 | End: 2025-04-08

## 2024-04-09 ENCOUNTER — CLINICAL SUPPORT (OUTPATIENT)
Dept: ALLERGY | Facility: CLINIC | Age: 34
End: 2024-04-09
Payer: COMMERCIAL

## 2024-04-09 DIAGNOSIS — J30.2 SEASONAL ALLERGIES: ICD-10-CM

## 2024-04-09 PROCEDURE — 95117 IMMUNOTHERAPY INJECTIONS: CPT | Performed by: ALLERGY & IMMUNOLOGY

## 2024-04-18 ENCOUNTER — SOCIAL WORK (OUTPATIENT)
Dept: PRIMARY CARE | Facility: CLINIC | Age: 34
End: 2024-04-18
Payer: COMMERCIAL

## 2024-04-18 NOTE — PROGRESS NOTES
Collaborative Care ([facility])  Progress Note    Type of Interaction: In Office    Start Time: 4:00 PM    End Time: 5:15 PM        Appointment: Scheduled    Reason for Visit:   Chief Complaint   Patient presents with    Anxiety    Depression    Follow-up    ADHD        Interval History / Patient Symptoms:     Patient Health Questionnaire-9 Score: 12 (4/5/2024  1:34 PM)  WILLIAM-7 Total Score: 11 (4/5/2024  1:35 PM)    [facility] met with pt for scheduled office visit. Pt reported he feels Mirtazapine has been helpful in treating his anxiety and depressive symptoms. Pt reported he has been sleeping well throughout the night and has tried to incorporate better sleep hygiene into his life. Pt reported he has had a few very stressful days at work and he feels he was able to cope with them effectively. Pt processed conflict with long term friend that has impacted his anxiety and mood. [facility] discussed assertive communication and healthy boundaries with pt. [facility] provided pt fair fighting rules so that he can learn effective communication skills to help resolve conflict.     Interventions Provided: [place] Setting, Psychoeducation, Interpersonal Therapy, Communication Training, and Develop Coping Strategies      Progress Made: Minimum    Response to Intervention: Pt was engaged throughout session and was receptive towards [facility] feedback and interventions provided.     Plan:     -Pt will utilize assertive communication skills learned in session in his daily routine  -Pt will review Set Boundaries Find Peace book  -Follow up with [facility] in 2 weeks    Follow Up / Next Appointment: Next appointment: 05/02/24

## 2024-04-30 PROCEDURE — 99493 SBSQ PSYC COLLAB CARE MGMT: CPT

## 2024-04-30 PROCEDURE — 99494 1ST/SBSQ PSYC COLLAB CARE: CPT

## 2024-05-01 ENCOUNTER — DOCUMENTATION (OUTPATIENT)
Dept: PRIMARY CARE | Facility: CLINIC | Age: 34
End: 2024-05-01
Payer: COMMERCIAL

## 2024-05-01 DIAGNOSIS — F41.1 GAD (GENERALIZED ANXIETY DISORDER): Primary | ICD-10-CM

## 2024-05-02 ENCOUNTER — SOCIAL WORK (OUTPATIENT)
Dept: PRIMARY CARE | Facility: CLINIC | Age: 34
End: 2024-05-02
Payer: COMMERCIAL

## 2024-05-02 NOTE — PROGRESS NOTES
Collaborative Care ([facility])  Progress Note    Type of Interaction: In Office    Start Time: 4:05 PM    End Time: 5:20 PM        Appointment: Scheduled    Reason for Visit:   Chief Complaint   Patient presents with    Anxiety    ADHD    Follow-up          Interval History / Patient Symptoms:     Patient Health Questionnaire-9 Score: 9 (5/7/2024  1:28 PM)  WILLIAM-7 Total Score: 6 (5/7/2024  1:29 PM)    M met with pt for scheduled office visit. Pt reported he has been doing pretty well. Pt reported sleep has improved and he is sleeping through the night uninterrupted. Pt does note some weight gain and attributes this to starting Mirtazapine. Pt reported he and his wife discussed fair fighting rules provided during last session and he reported she was in agreement with them. Pt processed conflict with his close friend and how it has impacted his relationship with his spouse. Pt is unsure if he is able to forgive what his friend has done but plans to discuss it further with his wife using assertive communication and healthy boundaries learned in session. M and pt discussed pt goals and progress throughout treatment. Pt reported he has an upcoming therapy appointment with new provider on 5/20/24 and has been meeting with psychiatry as well for med management.     Interventions Provided: [place] Setting, Develop Coping Strategies, and Review Progress/Goals Stress Management      Progress Made: Moderate    Response to Intervention: Pt was engaged throughout session and was receptive towards [facility] feedback and interventions provided.     Plan:     -Pt will attend appointment with psychologist at [facility] on 5/20/24  -Follow up with M in 3 weeks    Follow Up / Next Appointment: Next appointment: 05/23/24

## 2024-05-07 ENCOUNTER — CLINICAL SUPPORT (OUTPATIENT)
Dept: ALLERGY | Facility: CLINIC | Age: 34
End: 2024-05-07
Payer: COMMERCIAL

## 2024-05-07 DIAGNOSIS — J30.2 SEASONAL ALLERGIES: ICD-10-CM

## 2024-05-07 PROCEDURE — 95117 IMMUNOTHERAPY INJECTIONS: CPT | Performed by: ALLERGY & IMMUNOLOGY

## 2024-05-07 ASSESSMENT — PATIENT HEALTH QUESTIONNAIRE - PHQ9
4. FEELING TIRED OR HAVING LITTLE ENERGY: MORE THAN HALF THE DAYS
10. IF YOU CHECKED OFF ANY PROBLEMS, HOW DIFFICULT HAVE THESE PROBLEMS MADE IT FOR YOU TO DO YOUR WORK, TAKE CARE OF THINGS AT HOME, OR GET ALONG WITH OTHER PEOPLE: NOT DIFFICULT AT ALL
2. FEELING DOWN, DEPRESSED OR HOPELESS: SEVERAL DAYS
1. LITTLE INTEREST OR PLEASURE IN DOING THINGS: SEVERAL DAYS
SUM OF ALL RESPONSES TO PHQ QUESTIONS 1-9: 9
8. MOVING OR SPEAKING SO SLOWLY THAT OTHER PEOPLE COULD HAVE NOTICED. OR THE OPPOSITE, BEING SO FIGETY OR RESTLESS THAT YOU HAVE BEEN MOVING AROUND A LOT MORE THAN USUAL: NOT AT ALL
9. THOUGHTS THAT YOU WOULD BE BETTER OFF DEAD, OR OF HURTING YOURSELF: NOT AT ALL
3. TROUBLE FALLING OR STAYING ASLEEP: SEVERAL DAYS
5. POOR APPETITE OR OVEREATING: MORE THAN HALF THE DAYS
7. TROUBLE CONCENTRATING ON THINGS, SUCH AS READING THE NEWSPAPER OR WATCHING TELEVISION: SEVERAL DAYS
SUM OF ALL RESPONSES TO PHQ9 QUESTIONS 1 & 2: 2
6. FEELING BAD ABOUT YOURSELF - OR THAT YOU ARE A FAILURE OR HAVE LET YOURSELF OR YOUR FAMILY DOWN: SEVERAL DAYS

## 2024-05-07 ASSESSMENT — ANXIETY QUESTIONNAIRES
IF YOU CHECKED OFF ANY PROBLEMS ON THIS QUESTIONNAIRE, HOW DIFFICULT HAVE THESE PROBLEMS MADE IT FOR YOU TO DO YOUR WORK, TAKE CARE OF THINGS AT HOME, OR GET ALONG WITH OTHER PEOPLE: NOT DIFFICULT AT ALL
GAD7 TOTAL SCORE: 6
4. TROUBLE RELAXING: SEVERAL DAYS
3. WORRYING TOO MUCH ABOUT DIFFERENT THINGS: NOT AT ALL
6. BECOMING EASILY ANNOYED OR IRRITABLE: SEVERAL DAYS
5. BEING SO RESTLESS THAT IT IS HARD TO SIT STILL: SEVERAL DAYS
7. FEELING AFRAID AS IF SOMETHING AWFUL MIGHT HAPPEN: SEVERAL DAYS
1. FEELING NERVOUS, ANXIOUS, OR ON EDGE: SEVERAL DAYS
2. NOT BEING ABLE TO STOP OR CONTROL WORRYING: SEVERAL DAYS

## 2024-05-23 ENCOUNTER — SOCIAL WORK (OUTPATIENT)
Dept: PRIMARY CARE | Facility: CLINIC | Age: 34
End: 2024-05-23
Payer: COMMERCIAL

## 2024-05-23 NOTE — PROGRESS NOTES
Collaborative Care ([facility])  Progress Note    Type of Interaction: Phone    Start Time: 4:00 PM    End Time: 4:45 PM    Appointment: Scheduled    Reason for Visit:   Chief Complaint   Patient presents with    Anxiety    Follow-up    ADHD          Interval History / Patient Symptoms:     Patient Health Questionnaire-9 Score: 8 (5/24/2024  3:00 PM)  WILLIAM-7 Total Score: 3 (5/24/2024  3:01 PM)    [facility] spoke to pt for scheduled phone appointment. Pt reported he met with new psychologist for intake appointment and stated he has a follow up with them on 6/12/24 and plans to meet with them every 3 weeks. Pt reported he would like to work on ADHD coping skills and continue to process conflict with his friend which is causing him significant anxiety. [facility] role played with pt assertive communication skills and practices enforcing healthy boundaries. Pt reported he does not think he can trust his friend completely again and thinks he requires an apology before moving forward. [facility] reviewed pt goals and progress throughout treatment. Pt reported he is less anxious and denies any SI. Pt reported improved sleep and improved motivation. Pt reported his employer approved a work schedule that he feels he can manage for next year which he feels optimistic about. [facility] will transition pt treatment status to inactive and encouraged pt to contact his PCP if future [facility] services are needed. Pt was appreciative and voiced understanding.     Interventions Provided: [place] Setting, Psychoeducation, Review Progress/Goals Stress Management, and Roleplaying      Progress Made: Moderate    Response to Intervention: Pt was engaged throughout session and was receptive towards [facility] feedback and interventions provided.     Plan:     -Pt will follow up with psychologist on 6/12/24   -Pt will follow up with psychiatrist on 7/11/24   -Pt treatment status will transition to inactive and was advised to contact PCP if future [facility] services are needed. Pt voiced  understanding.     Follow Up / Next Appointment: N/A

## 2024-05-24 ENCOUNTER — DOCUMENTATION (OUTPATIENT)
Dept: PRIMARY CARE | Facility: CLINIC | Age: 34
End: 2024-05-24
Payer: COMMERCIAL

## 2024-05-24 DIAGNOSIS — F41.1 GAD (GENERALIZED ANXIETY DISORDER): Primary | ICD-10-CM

## 2024-05-24 PROCEDURE — 99494 1ST/SBSQ PSYC COLLAB CARE: CPT | Performed by: INTERNAL MEDICINE

## 2024-05-24 PROCEDURE — 99493 SBSQ PSYC COLLAB CARE MGMT: CPT | Performed by: INTERNAL MEDICINE

## 2024-05-24 ASSESSMENT — ANXIETY QUESTIONNAIRES
IF YOU CHECKED OFF ANY PROBLEMS ON THIS QUESTIONNAIRE, HOW DIFFICULT HAVE THESE PROBLEMS MADE IT FOR YOU TO DO YOUR WORK, TAKE CARE OF THINGS AT HOME, OR GET ALONG WITH OTHER PEOPLE: SOMEWHAT DIFFICULT
4. TROUBLE RELAXING: SEVERAL DAYS
7. FEELING AFRAID AS IF SOMETHING AWFUL MIGHT HAPPEN: NOT AT ALL
1. FEELING NERVOUS, ANXIOUS, OR ON EDGE: SEVERAL DAYS
2. NOT BEING ABLE TO STOP OR CONTROL WORRYING: NOT AT ALL
GAD7 TOTAL SCORE: 3
6. BECOMING EASILY ANNOYED OR IRRITABLE: SEVERAL DAYS
3. WORRYING TOO MUCH ABOUT DIFFERENT THINGS: NOT AT ALL
5. BEING SO RESTLESS THAT IT IS HARD TO SIT STILL: NOT AT ALL

## 2024-05-24 ASSESSMENT — PATIENT HEALTH QUESTIONNAIRE - PHQ9
SUM OF ALL RESPONSES TO PHQ9 QUESTIONS 1 & 2: 2
5. POOR APPETITE OR OVEREATING: MORE THAN HALF THE DAYS
3. TROUBLE FALLING OR STAYING ASLEEP: SEVERAL DAYS
10. IF YOU CHECKED OFF ANY PROBLEMS, HOW DIFFICULT HAVE THESE PROBLEMS MADE IT FOR YOU TO DO YOUR WORK, TAKE CARE OF THINGS AT HOME, OR GET ALONG WITH OTHER PEOPLE: SOMEWHAT DIFFICULT
8. MOVING OR SPEAKING SO SLOWLY THAT OTHER PEOPLE COULD HAVE NOTICED. OR THE OPPOSITE, BEING SO FIGETY OR RESTLESS THAT YOU HAVE BEEN MOVING AROUND A LOT MORE THAN USUAL: NOT AT ALL
2. FEELING DOWN, DEPRESSED OR HOPELESS: SEVERAL DAYS
9. THOUGHTS THAT YOU WOULD BE BETTER OFF DEAD, OR OF HURTING YOURSELF: NOT AT ALL
1. LITTLE INTEREST OR PLEASURE IN DOING THINGS: SEVERAL DAYS
4. FEELING TIRED OR HAVING LITTLE ENERGY: SEVERAL DAYS
6. FEELING BAD ABOUT YOURSELF - OR THAT YOU ARE A FAILURE OR HAVE LET YOURSELF OR YOUR FAMILY DOWN: SEVERAL DAYS
7. TROUBLE CONCENTRATING ON THINGS, SUCH AS READING THE NEWSPAPER OR WATCHING TELEVISION: SEVERAL DAYS
SUM OF ALL RESPONSES TO PHQ QUESTIONS 1-9: 8

## 2024-05-29 DIAGNOSIS — J30.9 ALLERGIC RHINITIS, UNSPECIFIED: ICD-10-CM

## 2024-05-31 DIAGNOSIS — J45.909 EXTRINSIC ASTHMA WITHOUT COMPLICATION, UNSPECIFIED ASTHMA SEVERITY, UNSPECIFIED WHETHER PERSISTENT (HHS-HCC): Primary | ICD-10-CM

## 2024-05-31 RX ORDER — MONTELUKAST SODIUM 10 MG/1
10 TABLET ORAL NIGHTLY
Qty: 90 TABLET | Refills: 0 | Status: SHIPPED | OUTPATIENT
Start: 2024-05-31

## 2024-06-04 ENCOUNTER — CLINICAL SUPPORT (OUTPATIENT)
Dept: ALLERGY | Facility: CLINIC | Age: 34
End: 2024-06-04
Payer: COMMERCIAL

## 2024-06-04 DIAGNOSIS — J30.2 SEASONAL ALLERGIES: ICD-10-CM

## 2024-06-04 PROCEDURE — 95117 IMMUNOTHERAPY INJECTIONS: CPT | Performed by: ALLERGY & IMMUNOLOGY

## 2024-06-26 DIAGNOSIS — R49.9 VOICE DISTURBANCE: Primary | ICD-10-CM

## 2024-06-26 RX ORDER — FLUTICASONE PROPIONATE 50 MCG
SPRAY, SUSPENSION (ML) NASAL
Qty: 16 G | Refills: 3 | Status: SHIPPED | OUTPATIENT
Start: 2024-06-26

## 2024-07-02 ENCOUNTER — APPOINTMENT (OUTPATIENT)
Dept: ALLERGY | Facility: CLINIC | Age: 34
End: 2024-07-02
Payer: COMMERCIAL

## 2024-07-02 DIAGNOSIS — J30.2 SEASONAL ALLERGIES: ICD-10-CM

## 2024-07-02 PROCEDURE — 95117 IMMUNOTHERAPY INJECTIONS: CPT | Performed by: ALLERGY & IMMUNOLOGY

## 2024-07-09 ENCOUNTER — OFFICE VISIT (OUTPATIENT)
Dept: OTOLARYNGOLOGY | Facility: CLINIC | Age: 34
End: 2024-07-09
Payer: COMMERCIAL

## 2024-07-09 VITALS — BODY MASS INDEX: 41.46 KG/M2 | TEMPERATURE: 97.5 F | WEIGHT: 306.1 LBS | HEIGHT: 72 IN

## 2024-07-09 DIAGNOSIS — K21.9 GASTROESOPHAGEAL REFLUX DISEASE WITHOUT ESOPHAGITIS: ICD-10-CM

## 2024-07-09 DIAGNOSIS — R49.0 DYSPHONIA: Primary | ICD-10-CM

## 2024-07-09 DIAGNOSIS — R49.9 VOICE DISTURBANCE: ICD-10-CM

## 2024-07-09 PROCEDURE — 99204 OFFICE O/P NEW MOD 45 MIN: CPT | Performed by: OTOLARYNGOLOGY

## 2024-07-09 PROCEDURE — 31579 LARYNGOSCOPY TELESCOPIC: CPT | Performed by: OTOLARYNGOLOGY

## 2024-07-09 PROCEDURE — 99214 OFFICE O/P EST MOD 30 MIN: CPT | Performed by: OTOLARYNGOLOGY

## 2024-07-09 ASSESSMENT — PATIENT HEALTH QUESTIONNAIRE - PHQ9
2. FEELING DOWN, DEPRESSED OR HOPELESS: NOT AT ALL
1. LITTLE INTEREST OR PLEASURE IN DOING THINGS: NOT AT ALL
SUM OF ALL RESPONSES TO PHQ9 QUESTIONS 1 AND 2: 0

## 2024-07-09 ASSESSMENT — PAIN SCALES - GENERAL: PAINLEVEL: 3

## 2024-07-09 NOTE — PROGRESS NOTES
Patient: Roosevelt Cartagena   MRN: [id number] YOB: 1990   Sex: male Age: 33 y.o.  Date of Service: 2024       ASSESSMENT AND PLAN  I discussed the findings with Roosevelt Cartagena and have recommended the followin. Dysphonia in middle school  after recent illness. Stroboscopy with mild edema along the medial vocal folds, moderate thickened mucus, no concerning masses or lesions  - SLP referral for voice therapy  - Mucus management strategies  - Follow-up with me as needed      CHIEF COMPLAINT  Chief Complaint   Patient presents with    [facility]       HISTORY OF PRESENT ILLNESS  Roosevelt Cartagena is a 33 y.o. male referred by Anthony Gorman MD for evaluation of dysphonia.  The patient is a middle school . He reports he had a nasty cold a few weeks ago (lots of rough coughing) right after the end of the school year and vocal cords haven't felt right since. He feels tenderness/pain on the left side if he is using his voice a lot. Gets worse at end of the day. He does not lose his voice completely but does lose it with URIs. He reports he was using his voice a lot more over the school year and was also smoking MJ. Since then he has quit smoking and drinking minimal alcohol. Trying to drink 1 gallon of water a day.     PMH: GERD, asthma  Meds: albuterol    ADDITIONAL HISTORY  Past Medical History  He has a past medical history of Abnormal EKG (2015), Attention-deficit hyperactivity disorder, predominantly inattentive type (2022), Chest pain (2015), Chondrocostal junction syndrome (tietze) (2016), Elevated blood-pressure reading, without diagnosis of hypertension (02/15/2021), Other chest pain (2015), Other specified disorders of penis (11/15/2021), Overweight (2013), Personal history of other diseases of the circulatory system (2013), Personal history of other diseases of the digestive system (2019), Sprain of unspecified ligament  of left ankle, initial encounter (11/02/2015), Strain of unspecified muscle and tendon at ankle and foot level, left foot, initial encounter (10/30/2015), Unspecified asthma, uncomplicated ([facility]) (02/11/2019), and Unspecified nonsuppurative otitis media, bilateral (09/26/2016). Surgical History  He has a past surgical history that includes Other surgical history (10/20/2021) and Other surgical history (10/11/2021).   Social History  He reports that he has never smoked. He has never used smokeless tobacco. He reports current alcohol use of about 7.0 - 10.0 standard drinks of alcohol per week. He reports current drug use. Drug: Marijuana. Allergies  Amoxicillin, Animal dander, Bee pollen, Cat dander, Mold, Pollen extracts, and Tree and shrub pollen     Family History  Family History   Problem Relation Name Age of Onset    Other (SEASONAL ALLERGIES) Mother      Other (BOARDERLINE DIABETES) Father      Other (HTN) Father      Pancreatic cancer Father      Other (SEASONAL ALLERGIES) Father      Other (FOOD ALLERGY) Brother      Diabetes Other FH     Heart disease Other FH     Pancreatic cancer Other GP         REVIEW OF SYSTEMS  All 10 systems were reviewed and negative except for above.      PHYSICAL EXAM  ENT Physical Exam   GENERAL: Well-nourished and developed, alert and appropriate, no distress, voice [id number]  RESPIRATORY: Breathing quietly, no stridor  HEAD: Normocephalic atraumatic  FACE: Symmetric, no masses or lesions  EYES:  Pupils reactive, sclera clear, external ocular muscles intact, no nystagmus.    EARS:  Pinnae normal. External auditory canals clear and tympanic membranes intact.  NOSE:  No anterior lesions, masses or polyps.  ORAL CAVITY/OROPHARYNX:  Buccal mucosa is moist without lesions or masses, tongue midline and palate elevates symmetrically. Tongue mobility intact.  NECK:  Soft. There is no lymphadenopathy or thyromegaly.    NEUROLOGIC:  Cranial nerves II-XII grossly intact.       Last  Recorded Vitals  Temperature 36.4 °C (97.5 °F), height 1.829 m (6'), weight 139 kg (306 lb 1.6 oz).    RESULTS    Patient Reported Outcome Measures  N/A    Laboratory, Radiology, and Pathology  I personally reviewed the following results, with the following interpretation:   N/A      PROCEDURES  Flexible Stroboscopy In Clinic    Date/Time: 7/9/2024 1:58 PM    Performed by: Yeimi Christopher MD  Authorized by: Yeimi Christopher MD      Flexible Fiberoptic Laryngoscopy with Stroboscopy    Patient failed a mirror exam due to limitations of equipment and the need for stroboscopy to assess glottic vibration and closure.     PREOPERATIVE DIAGNOSIS: Dysphonia    POSTOPERATIVE DIAGNOSIS: Same    PROCEDURE:  Strobovideolaryngoscopy    ANESTHESIA:  Topical    COMPLICATIONS:  None    SPECIMENS:  None    PROCEDURE IN DETAIL: The patient was seated in an upright position.  The nasal cavity was topically decongested and anesthetized.  The stroboscopic microphone was held to the neck at the level of the larynx.  The distal chip video laryngoscope was passed through the nasal cavity.  The nasal cavity and nasopharynx were within normal limits except noted below.  The following findings on stroboscopy were noted:      Tongue Base: no masses or lesions   Vocal Fold Mobility               Right VF: mobile               Left VF: mobile   TVF Appearance               Edema/Erythema: mild               Lesions/vibratory margin irregularities: mild irregular medial fold   Glottic Closure Pattern: complete    Vibration:               Phase: symmetric    Periodicity: regular                Amplitude: normal                Waveform: normal     Muscle Tension Patterns:  mild lateral   Other Findings: thickened mucus stranding    The patient tolerated the procedure well.        ----------------------------------------------------------------------  Yeimi Christopher MD, MAEd    [facility]  Department of  Otolaryngology - Head and Neck Surgery  [facility]    The total time I spent in care of this patient today (excluding time spent on other billable services) is as follows:    Time Spent  Prep time on day of patient encounter: 10 minutes  Time spent directly with patient, family or caregiver: 20 minutes  Additional Time Spent on Patient Care Activities: 5 minutes  Documentation Time: 10 minutes  Other Time Spent: 0 minutes  Total: 45 minutes

## 2024-07-22 DIAGNOSIS — R49.9 VOICE DISTURBANCE: ICD-10-CM

## 2024-07-22 RX ORDER — FLUTICASONE PROPIONATE 50 MCG
SPRAY, SUSPENSION (ML) NASAL
Qty: 48 ML | Refills: 2 | Status: SHIPPED | OUTPATIENT
Start: 2024-07-22

## 2024-07-29 DIAGNOSIS — J30.89 PERENNIAL ALLERGIC RHINITIS: ICD-10-CM

## 2024-07-29 DIAGNOSIS — J30.1 HAY FEVER: ICD-10-CM

## 2024-07-29 DIAGNOSIS — J30.81 ANIMAL DANDER ALLERGY: Primary | ICD-10-CM

## 2024-07-29 PROCEDURE — 95165 ANTIGEN THERAPY SERVICES: CPT | Performed by: ALLERGY & IMMUNOLOGY

## 2024-08-06 ENCOUNTER — APPOINTMENT (OUTPATIENT)
Dept: OTOLARYNGOLOGY | Facility: CLINIC | Age: 34
End: 2024-08-06
Payer: COMMERCIAL

## 2024-08-06 ENCOUNTER — APPOINTMENT (OUTPATIENT)
Dept: ALLERGY | Facility: CLINIC | Age: 34
End: 2024-08-06
Payer: COMMERCIAL

## 2024-08-06 DIAGNOSIS — J30.2 SEASONAL ALLERGIES: ICD-10-CM

## 2024-08-06 PROCEDURE — 95117 IMMUNOTHERAPY INJECTIONS: CPT | Performed by: ALLERGY & IMMUNOLOGY

## 2024-08-14 ENCOUNTER — EVALUATION (OUTPATIENT)
Dept: SPEECH THERAPY | Facility: CLINIC | Age: 34
End: 2024-08-14
Payer: COMMERCIAL

## 2024-08-14 DIAGNOSIS — R49.9 VOICE DISTURBANCE: ICD-10-CM

## 2024-08-14 DIAGNOSIS — R49.0 DYSPHONIA: Primary | ICD-10-CM

## 2024-08-14 PROCEDURE — 92524 BEHAVRAL QUALIT ANALYS VOICE: CPT | Mod: GN | Performed by: SPEECH-LANGUAGE PATHOLOGIST

## 2024-08-14 ASSESSMENT — PAIN - FUNCTIONAL ASSESSMENT: PAIN_FUNCTIONAL_ASSESSMENT: 0-10

## 2024-08-14 ASSESSMENT — PAIN SCALES - GENERAL: PAINLEVEL_OUTOF10: 0 - NO PAIN

## 2024-08-14 NOTE — PROGRESS NOTES
Speech-Language Pathology    Voice Evaluation    Patient Name: Roosevelt Cartagena  MRN: [id number]  Today's Date: 8/14/2024     Time Calculation  Start Time: 0930  Stop Time: 1030  Time Calculation (min): 60 min      Current Problem:  Patient Active Problem List   Diagnosis    ADHD, predominantly inattentive type    Allergic asthma ([facility])    Allergic rhinitis due to pollen    Allergy to animal dander    Dysfunction of both eustachian tubes    Dyslipidemia    Eczema of both hands    Esophageal reflux    Essential hypertension with goal blood pressure less than 130/85    Plantar fasciitis, left    Plantar fasciitis, right    Skin tag    Allergy to mold spores    Class 2 severe obesity due to excess calories with serious comorbidity and body mass index (BMI) of 37.0 to 37.9 in adult (Multi)    Stress at work    Erythema intertrigo    Other hypertrophic disorders of the skin    Dyspepsia    WILLIAM (generalized anxiety disorder)    Dysphonia       Voice Assessment:   Patient is a 35 yo male presenting with prolonged hoarseness after URI referred to me by Dr. Christopher. His recent stroboscopy revealed mild edema along the medial vocal folds, moderate thickened mucus, no concerning masses or lesions with recommendations for voice therapy.     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     Discussed vocal hygiene and wellness measures to include:  Increased hydration (Isabell)  Modified voice rest (confidential voicing)  Throat clear reduction  Amplification on field/in classroom  Humidification  Reflux management    Plan to engage in direct voice therapy targeting laryngeal balancing with use of RVT, Estille Voice.     Overall, patient would benefit from skilled ST in the area of voice in order to increase vocal wellness and healthy voicing to foster increased participation and comfort levels at home, work and in the community environment.      Voice Plan  of Care:  Frequency: x1/eowk  Duration: x12 weeks  Number of Visits: 4-8  Recommendations for therapeutic interventions: Speech/Voice exercises, Vocal hygiene program, Oral resonance techniques, Habituation training, Vocal strengthening techniques  Prognosis: Good  Factors affecting prognosis: None  Discussed Plan of Care: Patient, Physician, Discussed risks/benefits with patient/caregiver, Patient/caregiver agreeable with Plan of Care      Subjective   Current Problem:     Patient is a 33 yo male referred to me by Dr. Christopher for persistent hoarseness. Endorses odynophonia for several weeks following URI. Pain has improved but he continues to have difficulty with vocal fatigue, strain. He is a choral conductor and directs marching band and is concerned about his voice as he prepares for the upcoming semester. Cough has improved but he continues to throat clear. Voice improved with increased water intake and smoking cessation though patient endorses recent return to medical marijuana use and has lowered his water intake.     Additional history per Dr. Christopher below:    HISTORY OF PRESENT ILLNESS  Roosevelt Cartagena is a 33 y.o. male referred by Dr. Read, Anthony LUCERO MD for evaluation of dysphonia.  The patient is a middle school . He reports he had a nasty cold a few weeks ago (lots of rough coughing) right after the end of the school year and vocal cords haven't felt right since. He feels tenderness/pain on the left side if he is using his voice a lot. Gets worse at end of the day. He does not lose his voice completely but does lose it with URIs. He reports he was using his voice a lot more over the school year and was also smoking MJ. Since then he has quit smoking and drinking minimal alcohol. Trying to drink 1 gallon of water a day.      PMH: GERD, asthma  Meds: albuterol     ADDITIONAL HISTORY  Past Medical History  He has a past medical history of Abnormal EKG (05/11/2015), Attention-deficit hyperactivity  disorder, predominantly inattentive type (08/04/2022), Chest pain (05/11/2015), Chondrocostal junction syndrome (tietze) (09/26/2016), Elevated blood-pressure reading, without diagnosis of hypertension (02/15/2021), Other chest pain (05/22/2015), Other specified disorders of penis (11/15/2021), Overweight (07/30/2013), Personal history of other diseases of the circulatory system (07/30/2013), Personal history of other diseases of the digestive system (08/07/2019), Sprain of unspecified ligament of left ankle, initial encounter (11/02/2015), Strain of unspecified muscle and tendon at ankle and foot level, left foot, initial encounter (10/30/2015), Unspecified asthma, uncomplicated ([facility]) (02/11/2019), and Unspecified nonsuppurative otitis media, bilateral (09/26/2016). Surgical History  He has a past surgical history that includes Other surgical history (10/20/2021) and Other surgical history (10/11/2021).   Social History  He reports that he has never smoked. He has never used smokeless tobacco. He reports current alcohol use of about 7.0 - 10.0 standard drinks of alcohol per week. He reports current drug use. Drug: Marijuana. Allergies  Amoxicillin, Animal dander, Bee pollen, Cat dander, Mold, Pollen extracts, and Tree and shrub pollen       General Visit Information:  Patient Class: Outpatient  Living Environment: Home  Arrival: Independent  Ordering Physician: Dr. Christopher  Reason for Referral: voice change, reduced vocal stamina    Objective     Pain Assessment:  Pain Assessment: 0-10  0-10 (Numeric) Pain Score: 0 - No pain         Voice Use Inventory:  Voice misuse/abuse: Excessive talking (throat clearing)  Exposure to Noise: Yes  Exposure to respiratory irritants: Yes (Marijuanna)  Consistent use of singing voice: Yes  Occupation relies on speaking voice: Yes (, marhcing band instructor)    Patient Self Assessment:  Daily water intake: Yes (reduced)  Daily caffeine intake: Yes  Alcohol intake:  Yes  Smoking history: No (+Marijuanna)  Reflux history: Yes    Voice Objective:  Motor Speech Production: WFL  Pitch: Inadequate range  Voice Quality: Hoarse, Strained-Strangled  Fluency: WFL  Prosody: WFL  Resonance: WFL  Loudness: Excessive volume, Hard glottal attacks      Voice Analysis:   Flexible Stroboscopy In Clinic     Date/Time: 7/9/2024 1:58 PM     Performed by: Yeimi Christopher MD  Authorized by: Yeimi Christopher MD       Flexible Fiberoptic Laryngoscopy with Stroboscopy     Patient failed a mirror exam due to limitations of equipment and the need for stroboscopy to assess glottic vibration and closure.      PREOPERATIVE DIAGNOSIS: Dysphonia     POSTOPERATIVE DIAGNOSIS: Same     PROCEDURE:  Strobovideolaryngoscopy     ANESTHESIA:  Topical     COMPLICATIONS:  None     SPECIMENS:  None     PROCEDURE IN DETAIL: The patient was seated in an upright position.  The nasal cavity was topically decongested and anesthetized.  The stroboscopic microphone was held to the neck at the level of the larynx.  The distal chip video laryngoscope was passed through the nasal cavity.  The nasal cavity and nasopharynx were within normal limits except noted below.  The following findings on stroboscopy were noted:                            Tongue Base: no masses or lesions              Vocal Fold Mobility                          Right VF: mobile                          Left VF: mobile              TVF Appearance                          Edema/Erythema: mild                          Lesions/vibratory margin irregularities: mild irregular medial fold              Glottic Closure Pattern: complete               Vibration:                          Phase: symmetric               Periodicity: regular                           Amplitude: normal                           Waveform: normal                Muscle Tension Patterns:  mild lateral              Other Findings: thickened mucus stranding     The patient tolerated the procedure well.      Voice Treatment:  Individual(s) Educated: Patient  Verbal Education: Risks/benefits of therapy, Results of testing, Communication strategies  Response to Education: Verbalized understanding, Demonstrated understanding, Needs review  Patient/Caregiver Verbalized Understanding and Agreement: Yes          Time In: 0930  Time Out: 1030

## 2024-08-26 NOTE — PROGRESS NOTES
Subjective   Patient ID:   [id number]   Roosevelt Cartagena is a 34 y.o. male who presents for Follow-up, Allergies, and Asthma (And IT ).    Chief Complaint   Patient presents with    Follow-up    Allergies    Asthma     And IT       Patient presents for F/U of allergic rhinitis.  Started IT 7-.     Since last visit, 7-3-23, patient reports he vacationed in [place] and saw his sister in [place].  He developed a left ear infection there and went to the ED.  He was discharged on Cefdinir x7 days.    He states monthly shots are going well with no local reactions.  He continues on Zyrtec year-round and Singulair.  He does not miss the Singulair.  He has a cat and does not react to it as long as he is on his Zyrtec.  Dr. Read prescribed Flonase but he does not use it.      Patient states his asthma is controlled for the most part with rare use of his inhaler.  He typically uses it with exertion and used it in [place] a couple of times while outdoors.    Patient was seeing Yeimi Christopher MD, Otolaryngology, for speech issues.  He was on vocal rest for the first half of the summer at her request and has improved.     The patient is a middle school .     Objective   /84   Pulse 80   Wt 135 kg (297 lb)   SpO2 96%   BMI 40.28 kg/m²      Physical Exam  Constitutional:       Appearance: Normal appearance.   HENT:      Head: Normocephalic and atraumatic.      Right Ear: External ear normal. There is no impacted cerumen.      Left Ear: External ear normal. There is no impacted cerumen.      Nose: No congestion or rhinorrhea.   Eyes:      Extraocular Movements: Extraocular movements intact.      Conjunctiva/sclera: Conjunctivae normal.      Pupils: Pupils are equal, round, and reactive to light.   Cardiovascular:      Rate and Rhythm: Normal rate and regular rhythm.      Heart sounds: No murmur heard.     No friction rub. No gallop.   Pulmonary:      Effort: No respiratory distress.      Breath sounds:  No wheezing, rhonchi or rales.   Skin:     General: Skin is warm and dry.   Neurological:      Mental Status: He is alert.   Psychiatric:         Mood and Affect: Mood normal.         Behavior: Behavior normal.       Assessment/Plan       Allergic asthma ([facility])  ACT is 21  Albuterol as needed    Allergic rhinitis due to pollen  He will continue immunotherapy. He is overall doing very well. I would like him to try to stop the montelukast after the first freeze and end of pollen season. We discussed that he can restart it if he experiences increase in asthma symptoms or increased nasal congestion    By signing my name below, I, Best Roldan, attest that this documentation has been prepared under the direction and in the presence of Minda Hardin MD.  All medical record entries made by the Scribe were at my direction and personally dictated by me. I have reviewed the chart and agree that the record accurately reflects my personal performance of the history, physical exam, discussion and plan.

## 2024-08-27 ENCOUNTER — APPOINTMENT (OUTPATIENT)
Dept: ALLERGY | Facility: CLINIC | Age: 34
End: 2024-08-27
Payer: COMMERCIAL

## 2024-08-27 VITALS
SYSTOLIC BLOOD PRESSURE: 142 MMHG | OXYGEN SATURATION: 96 % | BODY MASS INDEX: 40.28 KG/M2 | DIASTOLIC BLOOD PRESSURE: 84 MMHG | WEIGHT: 297 LBS | HEART RATE: 80 BPM

## 2024-08-27 DIAGNOSIS — J45.20 MILD INTERMITTENT EXTRINSIC ASTHMA WITHOUT COMPLICATION (HHS-HCC): Primary | ICD-10-CM

## 2024-08-27 DIAGNOSIS — J30.1 ALLERGIC RHINITIS DUE TO POLLEN, UNSPECIFIED SEASONALITY: ICD-10-CM

## 2024-08-27 DIAGNOSIS — J45.909 EXTRINSIC ASTHMA WITHOUT COMPLICATION, UNSPECIFIED ASTHMA SEVERITY, UNSPECIFIED WHETHER PERSISTENT (HHS-HCC): ICD-10-CM

## 2024-08-27 PROCEDURE — 96160 PT-FOCUSED HLTH RISK ASSMT: CPT | Performed by: ALLERGY & IMMUNOLOGY

## 2024-08-27 PROCEDURE — 3077F SYST BP >= 140 MM HG: CPT | Performed by: ALLERGY & IMMUNOLOGY

## 2024-08-27 PROCEDURE — 99213 OFFICE O/P EST LOW 20 MIN: CPT | Performed by: ALLERGY & IMMUNOLOGY

## 2024-08-27 PROCEDURE — 95117 IMMUNOTHERAPY INJECTIONS: CPT | Performed by: ALLERGY & IMMUNOLOGY

## 2024-08-27 PROCEDURE — 3079F DIAST BP 80-89 MM HG: CPT | Performed by: ALLERGY & IMMUNOLOGY

## 2024-08-27 RX ORDER — ALBUTEROL SULFATE 90 UG/1
2 INHALANT RESPIRATORY (INHALATION) EVERY 4 HOURS PRN
Qty: 18 G | Refills: 0 | Status: SHIPPED | OUTPATIENT
Start: 2024-08-27

## 2024-08-27 NOTE — PATIENT INSTRUCTIONS
Shot was administered today.    Try to stop Singulair after the first true freeze.    Continue Zyrtec daily.     Continue albuterol as needed and prior to exercise.      Follow up for shots as scheduled.  Otherwise, in one year or sooner if symptoms worsen prior.

## 2024-08-29 NOTE — ASSESSMENT & PLAN NOTE
He will continue immunotherapy. He is overall doing very well. I would like him to try to stop the montelukast after the first freeze and end of pollen season. We discussed that he can restart it if he experiences increase in asthma symptoms or increased nasal congestion

## 2024-09-06 DIAGNOSIS — J30.9 ALLERGIC RHINITIS, UNSPECIFIED: ICD-10-CM

## 2024-09-06 RX ORDER — MONTELUKAST SODIUM 10 MG/1
10 TABLET ORAL NIGHTLY
Qty: 90 TABLET | Refills: 3 | Status: SHIPPED | OUTPATIENT
Start: 2024-09-06

## 2024-09-11 ENCOUNTER — TREATMENT (OUTPATIENT)
Dept: SPEECH THERAPY | Facility: CLINIC | Age: 34
End: 2024-09-11
Payer: COMMERCIAL

## 2024-09-11 DIAGNOSIS — R49.0 DYSPHONIA: Primary | ICD-10-CM

## 2024-09-11 PROCEDURE — 92507 TX SP LANG VOICE COMM INDIV: CPT | Mod: GN | Performed by: SPEECH-LANGUAGE PATHOLOGIST

## 2024-09-11 ASSESSMENT — PAIN - FUNCTIONAL ASSESSMENT: PAIN_FUNCTIONAL_ASSESSMENT: 0-10

## 2024-09-11 ASSESSMENT — PAIN SCALES - GENERAL: PAINLEVEL_OUTOF10: 0 - NO PAIN

## 2024-09-12 NOTE — PROGRESS NOTES
Speech-Language Pathology    SLP Adult Outpatient Speech-Language Pathology Treatment     Patient Name: Roosevelt Cartagena  MRN: [id number]  Today's Date: 9/11/2024            Current Problem:   1. Dysphonia              SLP Assessment:  SLP TX Intervention Outcome: Making Progress Towards Goals  Prognosis: Excellent  Treatment Tolerance: Patient tolerated treatment well  Barriers: None  Education Provided: Yes    Patient presents for continued voice therapy.    He has done well with increased water intake with use of Isabell monitor. Here today using a confidential voice for ~75% of voicing opportunities. 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     Unfortunately, he recently contracted a URI with sig increase in mucus and cough. Reports feeling restricted vocally. Provided instruction on modified voice rest and vocal recovery exercises. He will begin implementing cup bubbles, hum and trill work for vocal warm-up/cool-down. We also reviewed mucus management strategies and cough suppression techniques to limit phonotrauma. Plan to focus on vocal hygiene for quick recovery and return to planned estille targeting.     Will see him back in x3 weeks to assess readiness for Estille voice targeting.     Overall, patient continues to benefit from skilled intervention in the area of voice. Recommend continued tx in order to improve patient's overall vocal bipin in order to foster increased participation levels at home, work and in the community environment.     Plan:  Inpatient/Swing Bed or Outpatient: Outpatient  Treatment/Interventions: Voice  SLP TX Plan: Continue Plan of Care  SLP Plan: Skilled SLP  SLP Frequency: Every other week  Duration: 12 weeks  SLP Discharge Recommendations: Home independent  Discussed POC: Patient  Discussed Risks/Benefits: Yes, Patient  Patient/Caregiver Agreeable: Yes      Subjective   Current Problem:  dysphonia    Most Recent Visit:  SLP Received On: 09/11/24    General  Visit Information:   Reason for Referral: voice change, reduced vocal stamina  Referred By: Dr. Shabazz  Patient Seen During This Visit: Yes  Arrival: Independent      Pain Assessment:  Pain Assessment  Pain Assessment: 0-10  0-10 (Numeric) Pain Score: 0 - No pain      Objective       Voice:  Voice Interventions: Vocal Hygiene Program, Oral Resonance Techniques, Habituation Training  Voice Comments: hydration, humidifcation, cup bubbles, trill work       Active       Voice       SLP Goal 1       Start:  09/12/24    Expected End:  01/12/25       Patient will increase vocal wellness and decrease phonotrauma in adherence with clinician prescribed vocal hygiene and wellness program per patient report.          SLP Goal 2       Start:  09/12/24    Expected End:  01/12/25       Patient will accesses the upper fourth of their functional phonation range at 80 dB or below without delayed onset of phonation or obvious auditory or physical signs of laryngeal or kenyon-laryngeal hyperfunction.          SLP Goal 3       Start:  09/12/24    Expected End:  01/12/25       Patient will demonstrate independent use of voice/swallow/breathing techniques x80% accuracy.             Outpatient Education:  Adult Outpatient Education  Individual(s) Educated: Patient  Risk and Benefits Discussed with Patient/Caregiver/Other: yes  Patient/Caregiver Demonstrated Understanding: yes  Plan of Care Discussed and Agreed Upon: yes  Patient Response to Education: Patient/Caregiver Verbalized Understanding of Information, Patient/Caregiver Performed Return Demonstration of Exercises/Activities, Patient/Caregiver Asked Appropriate Questions

## 2024-09-23 ENCOUNTER — APPOINTMENT (OUTPATIENT)
Dept: PRIMARY CARE | Facility: CLINIC | Age: 34
End: 2024-09-23
Payer: COMMERCIAL

## 2024-09-23 DIAGNOSIS — Z00.00 ANNUAL PHYSICAL EXAM: Primary | ICD-10-CM

## 2024-09-23 DIAGNOSIS — J30.1 SEASONAL ALLERGIC RHINITIS DUE TO POLLEN: ICD-10-CM

## 2024-09-23 DIAGNOSIS — Z91.09 ALLERGY TO MOLD SPORES: ICD-10-CM

## 2024-09-23 DIAGNOSIS — I10 ESSENTIAL HYPERTENSION WITH GOAL BLOOD PRESSURE LESS THAN 130/85: ICD-10-CM

## 2024-09-23 DIAGNOSIS — E66.01 CLASS 3 SEVERE OBESITY WITH SERIOUS COMORBIDITY AND BODY MASS INDEX (BMI) OF 40.0 TO 44.9 IN ADULT, UNSPECIFIED OBESITY TYPE: ICD-10-CM

## 2024-09-23 DIAGNOSIS — R73.01 ELEVATED FASTING GLUCOSE: ICD-10-CM

## 2024-09-23 DIAGNOSIS — E78.5 DYSLIPIDEMIA: ICD-10-CM

## 2024-09-23 DIAGNOSIS — F41.9 ANXIETY: ICD-10-CM

## 2024-09-23 DIAGNOSIS — Z23 NEED FOR INFLUENZA VACCINATION: ICD-10-CM

## 2024-09-23 PROBLEM — E66.813 CLASS 3 SEVERE OBESITY WITH SERIOUS COMORBIDITY AND BODY MASS INDEX (BMI) OF 40.0 TO 44.9 IN ADULT: Status: ACTIVE | Noted: 2024-09-23

## 2024-09-23 PROCEDURE — 3075F SYST BP GE 130 - 139MM HG: CPT | Performed by: INTERNAL MEDICINE

## 2024-09-23 PROCEDURE — 90471 IMMUNIZATION ADMIN: CPT | Performed by: INTERNAL MEDICINE

## 2024-09-23 PROCEDURE — 90656 IIV3 VACC NO PRSV 0.5 ML IM: CPT | Performed by: INTERNAL MEDICINE

## 2024-09-23 PROCEDURE — 3008F BODY MASS INDEX DOCD: CPT | Performed by: INTERNAL MEDICINE

## 2024-09-23 PROCEDURE — 3079F DIAST BP 80-89 MM HG: CPT | Performed by: INTERNAL MEDICINE

## 2024-09-23 PROCEDURE — 99395 PREV VISIT EST AGE 18-39: CPT | Performed by: INTERNAL MEDICINE
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
## 2024-09-24 ENCOUNTER — APPOINTMENT (OUTPATIENT)
Dept: ALLERGY | Facility: CLINIC | Age: 34
End: 2024-09-24
Payer: COMMERCIAL

## 2024-09-24 DIAGNOSIS — J30.1 ALLERGIC RHINITIS DUE TO POLLEN, UNSPECIFIED SEASONALITY: ICD-10-CM

## 2024-09-24 PROCEDURE — 95117 IMMUNOTHERAPY INJECTIONS: CPT | Performed by: ALLERGY & IMMUNOLOGY

## 2024-09-26 VITALS
TEMPERATURE: 98.6 F | BODY MASS INDEX: 40.39 KG/M2 | WEIGHT: 298.2 LBS | OXYGEN SATURATION: 92 % | HEART RATE: 94 BPM | HEIGHT: 72 IN | DIASTOLIC BLOOD PRESSURE: 86 MMHG | SYSTOLIC BLOOD PRESSURE: 136 MMHG

## 2024-09-26 PROBLEM — E66.812 CLASS 2 SEVERE OBESITY DUE TO EXCESS CALORIES WITH SERIOUS COMORBIDITY AND BODY MASS INDEX (BMI) OF 37.0 TO 37.9 IN ADULT: Status: RESOLVED | Noted: 2023-01-28 | Resolved: 2024-09-26

## 2024-09-26 PROBLEM — E66.01 CLASS 2 SEVERE OBESITY DUE TO EXCESS CALORIES WITH SERIOUS COMORBIDITY AND BODY MASS INDEX (BMI) OF 37.0 TO 37.9 IN ADULT: Status: RESOLVED | Noted: 2023-01-28 | Resolved: 2024-09-26

## 2024-10-02 ENCOUNTER — TELEMEDICINE CLINICAL SUPPORT (OUTPATIENT)
Dept: SPEECH THERAPY | Facility: CLINIC | Age: 34
End: 2024-10-02
Payer: COMMERCIAL

## 2024-10-02 DIAGNOSIS — R49.0 DYSPHONIA: Primary | ICD-10-CM

## 2024-10-02 PROCEDURE — 92507 TX SP LANG VOICE COMM INDIV: CPT | Mod: GN,95 | Performed by: SPEECH-LANGUAGE PATHOLOGIST

## 2024-10-02 ASSESSMENT — PAIN SCALES - GENERAL: PAINLEVEL_OUTOF10: 0 - NO PAIN

## 2024-10-02 ASSESSMENT — PAIN - FUNCTIONAL ASSESSMENT: PAIN_FUNCTIONAL_ASSESSMENT: 0-10

## 2024-10-03 NOTE — PROGRESS NOTES
"Speech-Language Pathology    SLP Adult Outpatient Speech-Language Pathology Treatment     Patient Name: Roosevelt Cartagena  MRN: [id number]  Today's Date: 10/2/2024            Current Problem:   1. Dysphonia          Virtual or Telephone Consent    An interactive audio and video telecommunication system which permits real time communications between the patient (at the originating site) and provider (at the distant site) was utilized to provide this telehealth service.   Verbal consent was requested and obtained from Roosevelt Cartagena on this date, 10/02/24 for a telehealth visit.      SLP Assessment:  SLP TX Intervention Outcome: Making Progress Towards Goals  Prognosis: Excellent  Treatment Tolerance: Patient tolerated treatment well  Barriers: None  Education Provided: Yes    Patient presents for continued voice therapy.     Patient reports recent regression in vocal quality 2/2 illness. He had done well with water intake, mucus management and modified voice rest with subsequent return of vocal range. Unfortunately, I believe he increased his voice use too soon resulting in a return of dysphonia - rasp, vocal fatigue, reduced range. Here today with lowered SFF and is endorsing some fatigue as well.     Reviewed wellness strategies with emphasis on modified voice rest. 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 These include focus on rhythm and breathing with patient to add additional opportunities.     Discussed targeting of resonance as method to increase vocal output without putting strain on his voice. AES identified as good opportunity for increased vocal efficiency. Patient given visual, verbal and auditory education on AES constriction as it relates to vocal production. Patient able to complete isolated AES targeting with \"gnuck\" and vocal teasing. Successful progression to pentascales in various vowel shapes. Patient to practice this as able and " return for in person visit in x3 weeks.      Overall, patient continues to benefit from skilled intervention in the area of voice. Recommend continued tx in order to improve patient's overall vocal bipin in order to foster increased participation levels at home, work and in the community environment.     Plan:  Inpatient/Swing Bed or Outpatient: Outpatient  Treatment/Interventions: Voice  SLP TX Plan: Continue Plan of Care  SLP Plan: Skilled SLP  SLP Frequency: Every other week  Duration: 12 weeks  SLP Discharge Recommendations: Home independent  Discussed POC: Patient  Discussed Risks/Benefits: Yes, Patient  Patient/Caregiver Agreeable: Yes      Subjective   Current Problem:  dysphonia    Most Recent Visit:  SLP Received On: 10/02/24    General Visit Information:   Reason for Referral: voice change, reduced vocal stamina  Referred By: Dr. Shabazz  Patient Seen During This Visit: Yes  Arrival: Independent      Pain Assessment:  Pain Assessment  Pain Assessment: 0-10  0-10 (Numeric) Pain Score: 0 - No pain      Objective       Voice:  Voice Interventions: Vocal Hygiene Program, Oral Resonance Techniques, Habituation Training  Voice Comments: vocal wellness, quiet activity, AES           Active       Voice       SLP Goal 1 (Progressing)       Start:  09/12/24    Expected End:  01/12/25       Patient will increase vocal wellness and decrease phonotrauma in adherence with clinician prescribed vocal hygiene and wellness program per patient report.          SLP Goal 2 (Progressing)       Start:  09/12/24    Expected End:  01/12/25       Patient will accesses the upper fourth of their functional phonation range at 80 dB or below without delayed onset of phonation or obvious auditory or physical signs of laryngeal or kenyon-laryngeal hyperfunction.          SLP Goal 3 (Progressing)       Start:  09/12/24    Expected End:  01/12/25       Patient will demonstrate independent use of voice/swallow/breathing techniques x80%  accuracy.             Outpatient Education:  Adult Outpatient Education  Individual(s) Educated: Patient  Risk and Benefits Discussed with Patient/Caregiver/Other: yes  Patient/Caregiver Demonstrated Understanding: yes  Plan of Care Discussed and Agreed Upon: yes  Patient Response to Education: Patient/Caregiver Verbalized Understanding of Information, Patient/Caregiver Performed Return Demonstration of Exercises/Activities, Patient/Caregiver Asked Appropriate Questions

## 2024-10-22 ENCOUNTER — APPOINTMENT (OUTPATIENT)
Dept: ALLERGY | Facility: CLINIC | Age: 34
End: 2024-10-22
Payer: COMMERCIAL

## 2024-10-22 DIAGNOSIS — J30.2 SEASONAL ALLERGIES: ICD-10-CM

## 2024-10-22 PROCEDURE — 95117 IMMUNOTHERAPY INJECTIONS: CPT | Performed by: ALLERGY & IMMUNOLOGY

## 2024-10-23 ENCOUNTER — APPOINTMENT (OUTPATIENT)
Dept: PHARMACY | Facility: HOSPITAL | Age: 34
End: 2024-10-23
Payer: COMMERCIAL

## 2024-10-23 DIAGNOSIS — E66.813 CLASS 3 SEVERE OBESITY WITH SERIOUS COMORBIDITY AND BODY MASS INDEX (BMI) OF 40.0 TO 44.9 IN ADULT, UNSPECIFIED OBESITY TYPE: ICD-10-CM

## 2024-10-23 DIAGNOSIS — R73.01 ELEVATED FASTING GLUCOSE: ICD-10-CM

## 2024-10-23 DIAGNOSIS — E66.01 CLASS 3 SEVERE OBESITY WITH SERIOUS COMORBIDITY AND BODY MASS INDEX (BMI) OF 40.0 TO 44.9 IN ADULT, UNSPECIFIED OBESITY TYPE: ICD-10-CM

## 2024-10-23 NOTE — ASSESSMENT & PLAN NOTE
Discussed with patient the mechanism of GLP-1s, explained that it should help with lowering appetite and increasing the feeling of satiety and fullness, which addressed patient's most recent concerns. Discussed cost for patient as it is not currently covered by his insurance. Patient understands that Zepbound would cost $650 for a month supply with a savings card.  Answered patient questions regarding what to expect in terms of adverse drug reactions, future follow up telephone visits to assess tolerability and weight loss progress. Addressed concerns patient had about long term use of medication.     Patient expressed interest in starting a GLP-1, however, requested some time to consider the cost and will contact clinical pharmacist if decides to move forward with Zepbound.    Future Considerations:  If patient agrees to start Zepbound, would like to have first fill scheduled for  to coincide with an in-person visit for patient education on use of Zepbound.

## 2024-10-23 NOTE — PROGRESS NOTES
Clinical Pharmacy Appointment    Patient ID: Roosevelt Cartagena is a 34 y.o. male who presents for weight loss management    Pt is here for First appointment.     Referring Provider: Anthony Read MD  PCP: Anthony Read MD   Last visit with PCP: 09/23/2024    Next visit with PCP: 03/27/2025      Subjective   HPI    Patient was referred by Dr. Read for possible weight loss management. Patient is not currently on any weight loss medications - wanted to learn more about Zepbound and its weight loss benefits. In the past patient has tried dieting and exercise - has fluctuated in weight, however recently weight has been steadily increasing and after last annual wellness visit had concerns about being prediabetic. Patient has reported feeling hungry at night and late night cravings. Busy at work and with work associated activities has not had time for regular physical activity. Would like to consider weight loss medication to help reach weight loss goal of ~100 lbs.     WEIGHT LOSS  BMI Readings from Last 3 Encounters:   09/23/24 40.44 kg/m²   08/27/24 40.28 kg/m²   07/09/24 41.51 kg/m²     Current weight:  300 lbs. (10/23/2024)    Lifestyle  Diet: 3 meals/day. Late night cravings  BK: banana usually  LN: light lunch  DN: protein, carb, vegetables  Snacks: Salty, crunchy snacks, some times ice cream  Drinks: Black or herbal tea unsweetened, water  Feeling thirsty a lot recently  Has worked with nutritionist/dietician? No  Physical Activity: not regularly, try to take walks, tried gym, but did not see weight loss, felt more hungry, try to hike when time allows. On feet most the week for job    Other Potential Contributing Factors  Alcohol use: Average 5-6 drinks/week  Tobacco use: No  Other drug use: Yes - marijuana vape use occasionally (2-3x week), helps with anxiety  Medications that may cause weight gain: mirtazapine is associated significant weight gain and increase in appetite, patient   Mental health: No current  concerns  Eating Disorders: Denies Hx of any eating disorder  Comorbidities: Comorbidities: anxiety, esophageal reflux, high cholesterol, and hypertension controlled with oral meds    Relevant PMH for GLP-1 screening:  PMH of Pancreatitis? No  PMH of Retinopathy? No  PMH of MTC/MEN2? No    Non-Pharmacological Therapy  Weight loss techniques attempted:  Self-directed dieting: dieting jordana  Had a gym membership but did not utilize enough to justify the cost    Pharmacological Therapy  Current Medications: N/A    Insurance coverage of weight-loss medications? No, Wegovy and Zepbound not covered  Eligible for copay cards/programs? Yes, ~$650/month    Medication Estimated Cost Expected weight loss Patient Risks/Cautions Notes   Wegovy (semaglutide) ~$650/month w/ savings card 10-20% None   ~$1540 - not covered, saving card $650/month   Zepbound (tirzepatide) $650/month   w/ savings card 10-20%  Not covered - savings card $650/month        Medication Reconciliation:  No changes made at this time    Drug Interactions  No relevant drug interactions were noted.      Objective   Allergies   Allergen Reactions    Amoxicillin Unknown    Animal Dander Unknown    Bee Pollen Unknown    Cat Dander Other    Mold Unknown    Pollen Extracts Unknown    Tree And Shrub Pollen Unknown     Social History     Social History Narrative    Not on file      Medication Review  Current Outpatient Medications   Medication Instructions    albuterol 90 mcg/actuation inhaler 2 puffs, inhalation, Every 4 hours PRN, before exercising    amlodipine-olmesartan (Erich) 5-20 mg tablet 1 tablet, oral, Daily, Each morning For blood pressure in place of losartan    cetirizine (ZyrTEC) 10 mg tablet 1 tablet, Nightly    hydrocortisone 2.5 % cream APPLY TO SKIN FOLDS TWICE DAILY X14 DAYS THEN TWICE DAILY FOR 3 DAYS PER WEEK. MIX WITH KETOCONAZOLE    ketoconazole (NIZOral) 2 % cream APPLY TWICE DAILY FOR 2 WEEKS THEN USE 2 TIMES DAILY 3 DAYS PER WEEK. COMBINE WITH  "HYDROCORTISONE    methylphenidate ER (Concerta) 36 mg daily tablet One tablet 5 mornings each week    mirtazapine (REMERON) 15 mg, Nightly    montelukast (SINGULAIR) 10 mg, oral, Nightly    pantoprazole (PROTONIX) 40 mg, oral, 2 times daily, 30 minutes before breakfast and dinner      Vitals  BP Readings from Last 2 Encounters:   09/26/24 136/86   08/27/24 142/84     BMI Readings from Last 1 Encounters:   09/23/24 40.44 kg/m²      Labs  A1C  No results found for: \"HGBA1C\"  BMP  Lab Results   Component Value Date    CALCIUM 9.6 02/20/2024     02/20/2024    K 3.9 02/20/2024    CO2 25 02/20/2024     02/20/2024    BUN 12 02/20/2024    CREATININE 0.92 02/20/2024    EGFR >90 02/20/2024     LFTs  Lab Results   Component Value Date    ALT 43 02/20/2024    AST 22 02/20/2024    ALKPHOS 81 02/20/2024    BILITOT 0.3 02/20/2024     FLP  Lab Results   Component Value Date    TRIG 229 (H) 03/16/2023    CHOL 213 (H) 03/16/2023    LDLF 126 (H) 03/16/2023    HDL 41.2 03/16/2023     Urine Microalbumin  No results found for: \"MICROALBCREA\"  Weight Management  Wt Readings from Last 3 Encounters:   09/23/24 135 kg (298 lb 3.2 oz)   08/27/24 135 kg (297 lb)   07/09/24 139 kg (306 lb 1.6 oz)      There is no height or weight on file to calculate BMI.     Assessment/Plan   Problem List Items Addressed This Visit       Class 3 severe obesity with serious comorbidity and body mass index (BMI) of 40.0 to 44.9 in adult     Discussed with patient the mechanism of GLP-1s, explained that it should help with lowering appetite and increasing the feeling of satiety and fullness, which addressed patient's most recent concerns. Discussed cost for patient as it is not currently covered by his insurance. Patient understands that Zepbound would cost $650 for a month supply with a savings card.  Answered patient questions regarding what to expect in terms of adverse drug reactions, future follow up telephone visits to assess tolerability and " weight loss progress. Addressed concerns patient had about long term use of medication.     Patient expressed interest in starting a GLP-1, however, requested some time to consider the cost and will contact clinical pharmacist if decides to move forward with Zepbound.    Future Considerations:  If patient agrees to start Zepbound, would like to have first fill scheduled for  to coincide with an in-person visit for patient education on use of Zepbound.          Elevated fasting glucose       Thank you,  Gagan Bond, PharmD  PGY-1  Meds Resident    Verbal consent to manage patient's drug therapy was obtained from the patient. They were informed they may decline to participate or withdraw from participation in pharmacy services at any time.

## 2024-10-24 ENCOUNTER — LAB (OUTPATIENT)
Dept: LAB | Facility: LAB | Age: 34
End: 2024-10-24
Payer: COMMERCIAL

## 2024-10-24 DIAGNOSIS — E78.5 DYSLIPIDEMIA: ICD-10-CM

## 2024-10-24 DIAGNOSIS — R73.01 ELEVATED FASTING GLUCOSE: ICD-10-CM

## 2024-10-24 DIAGNOSIS — I10 ESSENTIAL HYPERTENSION WITH GOAL BLOOD PRESSURE LESS THAN 130/85: Chronic | ICD-10-CM

## 2024-10-24 LAB
ANION GAP SERPL CALC-SCNC: 14 MMOL/L (ref 10–20)
BUN SERPL-MCNC: 13 MG/DL (ref 6–23)
CALCIUM SERPL-MCNC: 9.6 MG/DL (ref 8.6–10.6)
CHLORIDE SERPL-SCNC: 103 MMOL/L (ref 98–107)
CHOLEST SERPL-MCNC: 206 MG/DL (ref 0–199)
CHOLESTEROL/HDL RATIO: 4.7
CO2 SERPL-SCNC: 28 MMOL/L (ref 21–32)
CREAT SERPL-MCNC: 0.94 MG/DL (ref 0.5–1.3)
EGFRCR SERPLBLD CKD-EPI 2021: >90 ML/MIN/1.73M*2
EST. AVERAGE GLUCOSE BLD GHB EST-MCNC: 117 MG/DL
GLUCOSE SERPL-MCNC: 110 MG/DL (ref 74–99)
HBA1C MFR BLD: 5.7 %
HDLC SERPL-MCNC: 43.6 MG/DL
LDLC SERPL CALC-MCNC: 143 MG/DL
NON HDL CHOLESTEROL: 162 MG/DL (ref 0–149)
POTASSIUM SERPL-SCNC: 4.5 MMOL/L (ref 3.5–5.3)
SODIUM SERPL-SCNC: 140 MMOL/L (ref 136–145)
TRIGL SERPL-MCNC: 99 MG/DL (ref 0–149)
TSH SERPL-ACNC: 1.28 MIU/L (ref 0.44–3.98)
VLDL: 20 MG/DL (ref 0–40)

## 2024-10-24 PROCEDURE — 80061 LIPID PANEL: CPT

## 2024-10-24 PROCEDURE — 80048 BASIC METABOLIC PNL TOTAL CA: CPT

## 2024-10-24 PROCEDURE — 36415 COLL VENOUS BLD VENIPUNCTURE: CPT

## 2024-10-24 PROCEDURE — 83036 HEMOGLOBIN GLYCOSYLATED A1C: CPT

## 2024-10-24 PROCEDURE — 84443 ASSAY THYROID STIM HORMONE: CPT

## 2024-10-25 NOTE — RESULT ENCOUNTER NOTE
Roosevelt    Thanks for doing the fasting labs.  The kidney electrolyte and thyroid labs look fine as does the thyroid test.    The cholesterol is a bit high.  Ideally the LDL/bad cholesterol should be under 100.  Will continue to follow this down the road.    The overnight 12-hour fasting glucose, as well as the 3-month measure of sugar control called A1c, are both slightly elevated in the prediabetes range.  Ongoing weight loss efforts and diet efforts towards reducing carbohydrate intake, will help improve these labs down the road.  Will continue to follow these accordingly.      Once again thanks for doing these labs.    Sincerely,  Anthony Read MD

## 2024-11-01 ENCOUNTER — TELEPHONE (OUTPATIENT)
Dept: PHARMACY | Facility: HOSPITAL | Age: 34
End: 2024-11-01
Payer: COMMERCIAL

## 2024-11-01 DIAGNOSIS — E66.01 CLASS 3 SEVERE OBESITY WITH SERIOUS COMORBIDITY AND BODY MASS INDEX (BMI) OF 40.0 TO 44.9 IN ADULT, UNSPECIFIED OBESITY TYPE: Primary | ICD-10-CM

## 2024-11-01 DIAGNOSIS — E66.813 CLASS 3 SEVERE OBESITY WITH SERIOUS COMORBIDITY AND BODY MASS INDEX (BMI) OF 40.0 TO 44.9 IN ADULT, UNSPECIFIED OBESITY TYPE: Primary | ICD-10-CM

## 2024-11-01 RX ORDER — TIRZEPATIDE 2.5 MG/.5ML
2.5 INJECTION, SOLUTION SUBCUTANEOUS
Qty: 2 ML | Refills: 0 | Status: SHIPPED | OUTPATIENT
Start: 2024-11-01

## 2024-11-06 ENCOUNTER — APPOINTMENT (OUTPATIENT)
Dept: PRIMARY CARE | Facility: CLINIC | Age: 34
End: 2024-11-06
Payer: COMMERCIAL

## 2024-11-13 ENCOUNTER — APPOINTMENT (OUTPATIENT)
Dept: PRIMARY CARE | Facility: CLINIC | Age: 34
End: 2024-11-13
Payer: COMMERCIAL

## 2024-11-13 ENCOUNTER — TREATMENT (OUTPATIENT)
Dept: SPEECH THERAPY | Facility: CLINIC | Age: 34
End: 2024-11-13
Payer: COMMERCIAL

## 2024-11-13 DIAGNOSIS — R49.0 DYSPHONIA: Primary | ICD-10-CM

## 2024-11-13 DIAGNOSIS — E66.01 CLASS 3 SEVERE OBESITY WITH SERIOUS COMORBIDITY AND BODY MASS INDEX (BMI) OF 40.0 TO 44.9 IN ADULT, UNSPECIFIED OBESITY TYPE: ICD-10-CM

## 2024-11-13 DIAGNOSIS — E66.813 CLASS 3 SEVERE OBESITY WITH SERIOUS COMORBIDITY AND BODY MASS INDEX (BMI) OF 40.0 TO 44.9 IN ADULT, UNSPECIFIED OBESITY TYPE: ICD-10-CM

## 2024-11-13 PROCEDURE — 92507 TX SP LANG VOICE COMM INDIV: CPT | Mod: GN | Performed by: SPEECH-LANGUAGE PATHOLOGIST

## 2024-11-13 ASSESSMENT — PAIN SCALES - GENERAL: PAINLEVEL_OUTOF10: 0 - NO PAIN

## 2024-11-13 ASSESSMENT — PAIN - FUNCTIONAL ASSESSMENT: PAIN_FUNCTIONAL_ASSESSMENT: 0-10

## 2024-11-13 NOTE — ASSESSMENT & PLAN NOTE
Starting weight: 294.6 lbs. (11/13/2024)    Completed in-person patient education for the administration of Zepbound 2.5 mg injectable solution to be injected under the skin once weekly. Reviewed common side effects and how to manage them, medication storage, and what to do in case of a missed dose. Patient was comfortable with administering his injection and first dose was administered today in office.     Will follow up in 4 weeks to assess tolerability and dose titration.

## 2024-11-13 NOTE — PROGRESS NOTES
Clinical Pharmacy Appointment    Patient ID: Roosevelt Cartagena is a 34 y.o. male who presents for weight loss management and GLP-1 therapy.    Pt is here for in-person patient education.     Referring Provider: Anthony Read MD  PCP: Anthony Read MD   Last visit with PCP: 09/23/2024   Next visit with PCP: 03/27/2025      Subjective     Interval History  Per last pharmacy visit, patient agreeable to initiate GLP-1 therapy for weight loss and had opted to start on Zepbound 2.5 mg injectable solution (vial) injected under the skin once weekly. Today's visit is to provide patient education on the administration of 1st dose of Zepbound using the injectable solution.     HPI  WEIGHT LOSS  BMI Readings from Last 3 Encounters:   09/23/24 40.44 kg/m²   08/27/24 40.28 kg/m²   07/09/24 41.51 kg/m²      Starting weight: 294.6 lbs. (11/13/2024)    Lifestyle  Diet: 3 meals/day. Late night cravings  BK: banana usually  LN: light lunch  DN: protein, carb, vegetables  Snacks: Salty, crunchy snacks, some times ice cream  Drinks: Black or herbal tea unsweetened, water  Feeling thirsty a lot recently  Has worked with nutritionist/dietician? No  Physical Activity: not regularly, try to take walks, tried gym, but did not see weight loss, felt more hungry, try to hike when time allows. On feet most the week for job     Other Potential Contributing Factors  Alcohol use: Average 5-6 drinks/week  Tobacco use: No  Other drug use: Yes - marijuana vape use occasionally (2-3x week), helps with anxiety  Medications that may cause weight gain: mirtazapine is associated significant weight gain and increase in appetite - for depression, prescribed by another provider, per patient chart, has been taking at least since 03/2024  Mental health: No current concerns  Eating Disorders: Denies Hx of any eating disorder  Comorbidities: Comorbidities: anxiety, esophageal reflux, high cholesterol, and hypertension controlled with oral meds     Relevant PMH for  GLP-1 screening:  PMH of Pancreatitis? No  PMH of Retinopathy? No  PMH of MTC/MEN2? No     Non-Pharmacological Therapy  Weight loss techniques attempted:  Self-directed dieting: dieting jordana  Had a gym membership but did not utilize enough to justify the cost     Pharmacological Therapy  Current Medications: N/A     Insurance coverage of weight-loss medications? No, Wegovy and Zepbound not covered  Eligible for copay cards/programs? Yes, ~$650/month for either Wegovy or Zepbound pen, alternative Zepbound injectable solution ~$400/month    Medication Reconciliation:  No changes made at this time     Drug Interactions  No relevant drug interactions were noted.      Objective   Allergies   Allergen Reactions    Amoxicillin Unknown    Animal Dander Unknown    Bee Pollen Unknown    Cat Dander Other    Mold Unknown    Pollen Extracts Unknown    Tree And Shrub Pollen Unknown     Social History     Social History Narrative    Not on file      Medication Review  Current Outpatient Medications   Medication Instructions    albuterol 90 mcg/actuation inhaler 2 puffs, inhalation, Every 4 hours PRN, before exercising    amlodipine-olmesartan (Erich) 5-20 mg tablet 1 tablet, oral, Daily, Each morning For blood pressure in place of losartan    cetirizine (ZyrTEC) 10 mg tablet 1 tablet, Nightly    hydrocortisone 2.5 % cream APPLY TO SKIN FOLDS TWICE DAILY X14 DAYS THEN TWICE DAILY FOR 3 DAYS PER WEEK. MIX WITH KETOCONAZOLE    ketoconazole (NIZOral) 2 % cream APPLY TWICE DAILY FOR 2 WEEKS THEN USE 2 TIMES DAILY 3 DAYS PER WEEK. COMBINE WITH HYDROCORTISONE    methylphenidate ER (Concerta) 36 mg daily tablet One tablet 5 mornings each week    mirtazapine (REMERON) 15 mg, Nightly    montelukast (SINGULAIR) 10 mg, oral, Nightly    pantoprazole (PROTONIX) 40 mg, oral, 2 times daily, 30 minutes before breakfast and dinner    Zepbound 2.5 mg, subcutaneous, Every 7 days      Vitals  BP Readings from Last 2 Encounters:   09/26/24 136/86  "  24 142/84     BMI Readings from Last 1 Encounters:   24 40.44 kg/m²      Labs  A1C  Lab Results   Component Value Date    HGBA1C 5.7 (H) 10/24/2024     BMP  Lab Results   Component Value Date    CALCIUM 9.6 10/24/2024     10/24/2024    K 4.5 10/24/2024    CO2 28 10/24/2024     10/24/2024    BUN 13 10/24/2024    CREATININE 0.94 10/24/2024    EGFR >90 10/24/2024     LFTs  Lab Results   Component Value Date    ALT 43 2024    AST 22 2024    ALKPHOS 81 2024    BILITOT 0.3 2024     FLP  Lab Results   Component Value Date    TRIG 99 10/24/2024    CHOL 206 (H) 10/24/2024    LDLF 126 (H) 2023    LDLCALC 143 (H) 10/24/2024    HDL 43.6 10/24/2024     Urine Microalbumin  No results found for: \"MICROALBCREA\"  Weight Management  Wt Readings from Last 3 Encounters:   24 135 kg (298 lb 3.2 oz)   24 135 kg (297 lb)   24 139 kg (306 lb 1.6 oz)        Completed in person education for the administration of once-weekly Zepbound (vials):    Instructed patient that Zepbound vials must be kept refrigerated, and if necessary a vial may be stored unrefrigerated at temperatures not to exceed 30C (86F) for up to 21 days.   Remove the vial from the refrigerator and check the label to make sure you have the right medicine and it has not . Additionally, ensure that the solution is not cloudy, discolored, or has particles in it.  Prior to administration, wash and rinse hands.   Next, choose your injection site (You may inject into your abdomen or thigh; another person may give you the injection in your upper arm).  Change (rotate) your injection site each week. You may use the same area of your body, but be sure to choose a different injection site in that area.  Once administration site has been selected, use a new alcohol swab to sanitize the administration site and allow to air dry.  Pull the plastic protective cap off of the vial, do NOT remove the rubber " stopper.   Remove the outer wrapping from the syringe and the outer wrapping from the needle. The needle may already be attached to the syringe already and in the same package. If the needle is separate place it on top of the syringe and turn until it is tight and firmly attached. Do not allow the tip of the syringe to touch anything.   Remove the needle shield off of the needle by pulling it straight off.   Hold the syringe in one hand with the needle pointing up. With your other hand pull down on the plunger until the tip of the plunger reaches the line at 0.5.  Push the needle through the rubber stopper of the vial and push the plunger all the way in to put air into the vial. This makes it easier to pull the solution from the vial.   Turn the vial and syringe upside down and ensure the tip of the syringe is in the liquid. Slowly pull the plunger down until the tip is past the 0.5 line.   If air bubbles are present lightly tap the syringe a few time to allow them to rise to the top.   Slowly push the plunger up until the tip reaches the 0.5 mL line.   Pull the syringe out of the rubber stopper. Do not allow the needle to touch anything prior to injecting.   Insert the needle into your skin at the chosen injection site and push down on the plunger to inject your dose. The needle should stay in your skin for at least 5 seconds to ensure the whole dose has been injected.   Pull the needle out of the skin. Do not rub the area.   Do NOT recap the needle.   Dispose of the needle and syringe in a sharps container (i.e. Coffee can, laundry detergent bottle). The opened vial can be thrown in the trash.   Injections should be done on the same day every week, if you forget you have up to 4 days (96 hours) to remember to do your next dose.        Assessment/Plan   Problem List Items Addressed This Visit       Class 3 severe obesity with serious comorbidity and body mass index (BMI) of 40.0 to 44.9 in adult     Starting  weight: 294.6 lbs. (11/13/2024)    Completed in-person patient education for the administration of Zepbound 2.5 mg injectable solution to be injected under the skin once weekly. Reviewed common side effects and how to manage them, medication storage, and what to do in case of a missed dose. Patient was comfortable with administering his injection and first dose was administered today in office.     Will follow up in 4 weeks to assess tolerability and dose titration.          Relevant Orders    Referral to Clinical Pharmacy     Patient also educated on common side effects and warnings:    Increased satiety is common  Stomach upset such as stomach cramping, nausea, constipation, etc which can be precipitated by eating fatty greasy foods and overeating  Discussed risks of GLP1ra including risk of pancreatitis, MTC and worsening of DR  Also discussed risks of gastroparesis and gastroparalysis as this is a common discussion point in the news - patient was informed that this is rare and they have no risk factors increasing their risk for developing these issues      Clinical Pharmacist follow-up: 12/05/2024, Telehealth visit    Continue all meds under the continuation of care with the referring provider and clinical pharmacy team.    Thank you,  Shena Bond (Suji), PharmD  PGY-1  Meds Pharmacy Resident     Verbal consent to manage patient's drug therapy was obtained from the patient. They were informed they may decline to participate or withdraw from participation in pharmacy services at any time.

## 2024-11-14 NOTE — PROGRESS NOTES
Speech-Language Pathology    SLP Adult Outpatient Speech-Language Pathology Treatment     Patient Name: Roosevelt Cartagena  MRN: [id number]  Today's Date: 11/14/2024            Current Problem:   1. Dysphonia              SLP Assessment:  SLP TX Intervention Outcome: Making Progress Towards Goals  Prognosis: Excellent  Treatment Tolerance: Patient tolerated treatment well  Barriers: None  Education Provided: Yes    Patient presents for continued voice therapy.     Patient reports vocal improvement in regards to quality and stamina. 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 States voice has improved with increased range, reduced strain and improved overall quality.     Patient is doing well monitoring his vocal volume - here today in confidential voicing for majority of voice use. Observed frequent glottal attack during connected speech. Provided him with education and multimodality cueing to help with increasing his vocal/respiratory coordination. Given visual cues, patient able to sequence coordinated vocal onsets with >60% accuracy.     Plan to follow-up in 4 weeks to assess progress and determine readiness for discharge.     Overall, patient continues to benefit from skilled intervention in the area of voice. Recommend continued tx in order to improve patient's overall vocal bipin in order to foster increased participation levels at home, work and in the community environment.   Plan:  Inpatient/Swing Bed or Outpatient: Outpatient  Treatment/Interventions: Voice  SLP TX Plan: Continue Plan of Care  SLP Plan: Skilled SLP  SLP Frequency: Every other week  Duration: 12 weeks  SLP Discharge Recommendations: Home independent  Discussed POC: Patient  Discussed Risks/Benefits: Yes, Patient  Patient/Caregiver Agreeable: Yes      Subjective   Current Problem:  dysphonia    Most Recent Visit:  SLP Received On:  11/13/24    General Visit Information:   Reason for Referral: voice change, reduced vocal stamina  Referred By: Dr. Shabazz  Patient Seen During This Visit: Yes  Arrival: Independent      Pain Assessment:  Pain Assessment  Pain Assessment: 0-10  0-10 (Numeric) Pain Score: 0 - No pain      Objective       Voice:  Voice Interventions: Vocal Hygiene Program, Oral Resonance Techniques, Habituation Training  Voice Comments: vocal wellness, reduction on glottal attack         Active       Voice       SLP Goal 1 (Progressing)       Start:  09/12/24    Expected End:  01/12/25       Patient will increase vocal wellness and decrease phonotrauma in adherence with clinician prescribed vocal hygiene and wellness program per patient report.          SLP Goal 2 (Progressing)       Start:  09/12/24    Expected End:  01/12/25       Patient will accesses the upper fourth of their functional phonation range at 80 dB or below without delayed onset of phonation or obvious auditory or physical signs of laryngeal or kenyon-laryngeal hyperfunction.          SLP Goal 3 (Progressing)       Start:  09/12/24    Expected End:  01/12/25       Patient will demonstrate independent use of voice/swallow/breathing techniques x80% accuracy.             Outpatient Education:  Adult Outpatient Education  Individual(s) Educated: Patient  Risk and Benefits Discussed with Patient/Caregiver/Other: yes  Patient/Caregiver Demonstrated Understanding: yes  Plan of Care Discussed and Agreed Upon: yes  Patient Response to Education: Patient/Caregiver Verbalized Understanding of Information, Patient/Caregiver Performed Return Demonstration of Exercises/Activities, Patient/Caregiver Asked Appropriate Questions

## 2024-11-19 ENCOUNTER — APPOINTMENT (OUTPATIENT)
Dept: ALLERGY | Facility: CLINIC | Age: 34
End: 2024-11-19
Payer: COMMERCIAL

## 2024-11-19 DIAGNOSIS — J30.2 SEASONAL ALLERGIES: ICD-10-CM

## 2024-11-19 PROCEDURE — 95117 IMMUNOTHERAPY INJECTIONS: CPT | Performed by: ALLERGY & IMMUNOLOGY

## 2024-11-25 ENCOUNTER — TELEPHONE (OUTPATIENT)
Dept: PRIMARY CARE | Facility: CLINIC | Age: 34
End: 2024-11-25
Payer: COMMERCIAL

## 2024-11-25 DIAGNOSIS — E66.01 CLASS 3 SEVERE OBESITY WITH SERIOUS COMORBIDITY AND BODY MASS INDEX (BMI) OF 40.0 TO 44.9 IN ADULT, UNSPECIFIED OBESITY TYPE: ICD-10-CM

## 2024-11-25 DIAGNOSIS — E66.813 CLASS 3 SEVERE OBESITY WITH SERIOUS COMORBIDITY AND BODY MASS INDEX (BMI) OF 40.0 TO 44.9 IN ADULT, UNSPECIFIED OBESITY TYPE: ICD-10-CM

## 2024-11-25 RX ORDER — TIRZEPATIDE 2.5 MG/.5ML
INJECTION, SOLUTION SUBCUTANEOUS
Qty: 2 ML | Refills: 0 | OUTPATIENT
Start: 2024-11-25

## 2024-11-25 RX ORDER — TIRZEPATIDE 2.5 MG/.5ML
2.5 INJECTION, SOLUTION SUBCUTANEOUS
Qty: 2 ML | Refills: 3 | Status: SHIPPED | OUTPATIENT
Start: 2024-11-25 | End: 2024-11-25 | Stop reason: SDUPTHER

## 2024-11-25 RX ORDER — TIRZEPATIDE 2.5 MG/.5ML
2.5 INJECTION, SOLUTION SUBCUTANEOUS
Qty: 2 ML | Refills: 3 | Status: SHIPPED | OUTPATIENT
Start: 2024-11-25

## 2024-11-25 NOTE — TELEPHONE ENCOUNTER
Pt called in stating he does not understand why this went over to [facility] and why it needed an authorization. He usually uses [facility] directly. Please advise. Thank you!

## 2024-11-27 DIAGNOSIS — J45.20 MILD INTERMITTENT EXTRINSIC ASTHMA WITHOUT COMPLICATION (HHS-HCC): Primary | ICD-10-CM

## 2024-12-01 RX ORDER — ALBUTEROL SULFATE 90 UG/1
2 INHALANT RESPIRATORY (INHALATION) EVERY 4 HOURS PRN
Qty: 18 G | Refills: 0 | Status: SHIPPED | OUTPATIENT
Start: 2024-12-01

## 2024-12-04 NOTE — PROGRESS NOTES
Clinical Pharmacy Appointment    Patient ID: Roosevelt Cartagena is a 34 y.o. male who presents for weight loss and GLP-1 therapy management.    Pt is here for Follow Up appointment.     Referring Provider: Anthony Reda MD  PCP: Anthony Raed MD   Last visit with PCP: 09/23/2024   Next visit with PCP: 03/27/2025      Subjective     Interval History  Patient was referred to clinical pharmacy for weight loss management and possible GLP-1 therapy. Initial visit was 10/23/2024 and patient opted to start Zepbound 2.5 mg subcutaneous solution from [employer] 2000. Patient's BMI was 40.44 (09/23/2024) meeting criteria for Zepbound for weight loss management.     Patient was seen 11/13/2024 for in-person patient education for first dose administration of Zepbound 2.5 mg subcutaneous solution to be injected under the skin once weekly. Patient demonstrated understanding of how to administer his Zepbound and was comfortable administering his first dose in-office.     HPI  WEIGHT LOSS  BMI Readings from Last 3 Encounters:   09/23/24 40.44 kg/m²   08/27/24 40.28 kg/m²   07/09/24 41.51 kg/m²      Starting weight: 294.6 lbs. (11/13/2024)  Current weight: 290.4 lbs. (12/05/2024)    Lifestyle  Diet: 3 meals/day. Late night cravings have been reduced  BK: banana usually  LN: light lunch  DN: protein, carb, vegetables  Snacks: Reduced the snacking, changed out ice cream for greek yogurt  Drinks: Black or herbal tea unsweetened, water  Has worked with nutritionist/dietician? No  Physical Activity: not regularly, try to take walks, tried gym, but did not see weight loss, felt more hungry, try to hike when time allows. On feet most the week for job     Other Potential Contributing Factors  Alcohol use: Average 5-6 drinks/week  Tobacco use: No  Other drug use: Yes - marijuana vape use occasionally (2-3x week), helps with anxiety  Medications that may cause weight gain: mirtazapine is associated significant weight gain and increase in appetite -  for depression, prescribed by another provider, per patient chart, has been taking at least since 03/2024  Mental health: No current concerns  Eating Disorders: Denies Hx of any eating disorder  Comorbidities: Comorbidities: anxiety, esophageal reflux, high cholesterol, and hypertension controlled with oral meds     Relevant PMH for GLP-1 screening:  PMH of Pancreatitis? No  PMH of Retinopathy? No  PMH of MTC/MEN2? No     Non-Pharmacological Therapy  Weight loss techniques attempted:  Self-directed dieting: dieting jordana  Had a gym membership but did not utilize enough to justify the cost    Pharmacological Therapy  Current Medications:   Zepbound 2.5 mg subcutaneous solution injected under the skin once weekly (Wednesdays)  Last dose taken: 12/4/2024  Remaining doses: 4 - was refilled by patient, forgotten about possibly increasing dose at next follow up   Adverse Effects: nausea, more so during first days of dose administration, however tolerable  Has noticed his usual portion size has gotten smaller, gets ruff faster   Previous Medications: N/A     Insurance coverage of weight-loss medications? No, paying out of pocket, currently getting Zepbound through [facility]     Drug Interactions  No relevant drug interactions were noted.    Medication System Management  Patient's preferred pharmacy:   [id number] [address] [facility]  [id number] [place] [id number]  Phone: [phone] Fax: [phone]    [facility]/pharmacy #[id number] - [place], [place] - [address] [facility]  [address] [facility] [address]  Phone: [phone] Fax: [phone]    [facility] - [place] [address]  [facility] [address]  Phone: [phone] Fax: [phone]    Objective   Allergies   Allergen Reactions    Amoxicillin Unknown    Animal Dander Unknown    Bee Pollen Unknown    Cat Dander Other    Mold Unknown     Pollen Extracts Unknown    Tree And Shrub Pollen Unknown     Social History     Social History Narrative    Not on file      Medication Review  Current Outpatient Medications   Medication Instructions    albuterol 90 mcg/actuation inhaler 2 puffs, inhalation, Every 4 hours PRN, before exercising    amlodipine-olmesartan (Erich) 5-20 mg tablet 1 tablet, oral, Daily, Each morning For blood pressure in place of losartan    cetirizine (ZyrTEC) 10 mg tablet 1 tablet, Nightly    hydrocortisone 2.5 % cream APPLY TO SKIN FOLDS TWICE DAILY X14 DAYS THEN TWICE DAILY FOR 3 DAYS PER WEEK. MIX WITH KETOCONAZOLE    ketoconazole (NIZOral) 2 % cream APPLY TWICE DAILY FOR 2 WEEKS THEN USE 2 TIMES DAILY 3 DAYS PER WEEK. COMBINE WITH HYDROCORTISONE    methylphenidate ER (Concerta) 36 mg daily tablet One tablet 5 mornings each week    mirtazapine (REMERON) 15 mg, Nightly    montelukast (SINGULAIR) 10 mg, oral, Nightly    pantoprazole (PROTONIX) 40 mg, oral, 2 times daily, 30 minutes before breakfast and dinner    Zepbound 2.5 mg, subcutaneous, Every 7 days      Vitals  BP Readings from Last 2 Encounters:   09/26/24 136/86   08/27/24 142/84     BMI Readings from Last 1 Encounters:   09/23/24 40.44 kg/m²      Labs  A1C  Lab Results   Component Value Date    HGBA1C 5.7 (H) 10/24/2024     BMP  Lab Results   Component Value Date    CALCIUM 9.6 10/24/2024     10/24/2024    K 4.5 10/24/2024    CO2 28 10/24/2024     10/24/2024    BUN 13 10/24/2024    CREATININE 0.94 10/24/2024    EGFR >90 10/24/2024     LFTs  Lab Results   Component Value Date    ALT 43 02/20/2024    AST 22 02/20/2024    ALKPHOS 81 02/20/2024    BILITOT 0.3 02/20/2024     FLP  Lab Results   Component Value Date    TRIG 99 10/24/2024    CHOL 206 (H) 10/24/2024    LDLF 126 (H) 03/16/2023    LDLCALC 143 (H) 10/24/2024    HDL 43.6 10/24/2024       Weight Management  Wt Readings from Last 3 Encounters:   09/23/24 135 kg (298 lb 3.2 oz)   08/27/24 135 kg (297 lb)    07/09/24 139 kg (306 lb 1.6 oz)        Assessment/Plan   Problem List Items Addressed This Visit       Class 3 severe obesity with serious comorbidity and body mass index (BMI) of 40.0 to 44.9 in adult     Patient's current weight reported as 290.4. Has lost 4.2 lbs (1.4% of TBW) since starting therapy plan.   Current regimen includes   Zepbound 2.5 mg injected under the skin once weekly    Spoke with patient and reported he was doing well despite some nausea, generally due to either empty stomach or over eating. Patient understands what foods to avoid to help reduced side effects of Zepboud and still learning to adjust to his hunger cues. Patient is excited about progress and how it has been helping him rethink his food choices.     Patient is comfortable with increasing dose to Zepbound 5 mg, however, due to some issues, patient had paid for a refill of Zepbound 2.5 mg. Discussed with patient going forward reach out to clinical pharmacist if questions or concerns about his Zepbound.     Due to patient cost, patient will remain on 2.5 mg for the next month and will follow up in 4 weeks to discuss increasing dose to Zepbound 5 mg once weekly.     Medication Changes:  CONTINUE  Zepbound 2.5 mg injected under the skin once weekly         Relevant Orders    Referral to Clinical Pharmacy     Clinical Pharmacist follow-up: 1/2/2025, Telehealth visit    Continue all meds under the continuation of care with the referring provider and clinical pharmacy team.    Thank you,  Shena Bond (Suji), PharmD  PGY-1  Meds Pharmacy Resident     Verbal consent to manage patient's drug therapy was obtained from the patient. They were informed they may decline to participate or withdraw from participation in pharmacy services at any time.

## 2024-12-05 ENCOUNTER — APPOINTMENT (OUTPATIENT)
Dept: PHARMACY | Facility: HOSPITAL | Age: 34
End: 2024-12-05
Payer: COMMERCIAL

## 2024-12-05 DIAGNOSIS — E66.813 CLASS 3 SEVERE OBESITY WITH SERIOUS COMORBIDITY AND BODY MASS INDEX (BMI) OF 40.0 TO 44.9 IN ADULT, UNSPECIFIED OBESITY TYPE: ICD-10-CM

## 2024-12-05 DIAGNOSIS — E66.01 CLASS 3 SEVERE OBESITY WITH SERIOUS COMORBIDITY AND BODY MASS INDEX (BMI) OF 40.0 TO 44.9 IN ADULT, UNSPECIFIED OBESITY TYPE: ICD-10-CM

## 2024-12-05 NOTE — ASSESSMENT & PLAN NOTE
Patient's current weight reported as 290.4. Has lost 4.2 lbs (1.4% of TBW) since starting therapy plan.   Current regimen includes   Zepbound 2.5 mg injected under the skin once weekly    Spoke with patient and reported he was doing well despite some nausea, generally due to either empty stomach or over eating. Patient understands what foods to avoid to help reduced side effects of Zepboud and still learning to adjust to his hunger cues. Patient is excited about progress and how it has been helping him rethink his food choices.     Patient is comfortable with increasing dose to Zepbound 5 mg, however, due to some issues, patient had paid for a refill of Zepbound 2.5 mg. Discussed with patient going forward reach out to clinical pharmacist if questions or concerns about his Zepbound.     Due to patient cost, patient will remain on 2.5 mg for the next month and will follow up in 4 weeks to discuss increasing dose to Zepbound 5 mg once weekly.     Medication Changes:  CONTINUE  Zepbound 2.5 mg injected under the skin once weekly

## 2024-12-09 DIAGNOSIS — R10.13 DYSPEPSIA: ICD-10-CM

## 2024-12-09 DIAGNOSIS — K21.9 GASTROESOPHAGEAL REFLUX DISEASE WITHOUT ESOPHAGITIS: ICD-10-CM

## 2024-12-09 RX ORDER — PANTOPRAZOLE SODIUM 40 MG/1
40 TABLET, DELAYED RELEASE ORAL 2 TIMES DAILY
Qty: 180 TABLET | Refills: 3 | Status: SHIPPED | OUTPATIENT
Start: 2024-12-09 | End: 2025-12-09

## 2024-12-17 ENCOUNTER — APPOINTMENT (OUTPATIENT)
Dept: ALLERGY | Facility: CLINIC | Age: 34
End: 2024-12-17
Payer: COMMERCIAL

## 2024-12-17 DIAGNOSIS — J30.2 SEASONAL ALLERGIES: ICD-10-CM

## 2024-12-17 PROCEDURE — 95117 IMMUNOTHERAPY INJECTIONS: CPT | Performed by: ALLERGY & IMMUNOLOGY

## 2024-12-23 ENCOUNTER — TREATMENT (OUTPATIENT)
Dept: SPEECH THERAPY | Facility: HOSPITAL | Age: 34
End: 2024-12-23
Payer: COMMERCIAL

## 2024-12-23 DIAGNOSIS — R49.0 DYSPHONIA: Primary | ICD-10-CM

## 2024-12-23 PROCEDURE — 92507 TX SP LANG VOICE COMM INDIV: CPT | Mod: GN | Performed by: SPEECH-LANGUAGE PATHOLOGIST

## 2024-12-23 NOTE — PROGRESS NOTES
Speech-Language Pathology    Discharge Summary    Name: Roosevelt Cartagena  MRN: [id number]  : 1990  Date: 24    Discharge Summary: SLP    Discharge Information: Date of discharge 24    Therapy Summary:   Patient completed voice therapy targeting vocal wellness and healthy voicing secondary to c/o vocal fatigue, dysphonia and odynophonia. He has focused on increased hydration, limiting his conversational volume, modified voice rest and vocal exercises. 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 He denies any vocal fatigue reporting clear and easy voicing. Discussed wellness measures to maintain with emphasis on good hydration and laryngeal offloading. Patient is happy and encouraged with new vocal habits.    Overall, recommend patient be d/c'ed at this time secondary to successful completion of STGs with patient negative for presenting complaint. Patient given verbal instruction to return to clinic given e/o decline in voice, swallow or airway with patient verbalizing agreement and understanding with current plan.      Discharge Status: home, indep     Rehab Discharge Reason: Achieved all and/or the most significant goals(s)

## 2025-01-01 NOTE — PROGRESS NOTES
Clinical Pharmacy Appointment    Patient ID: Roosevelt Cartagena is a 34 y.o. male who presents for weight loss and GLP-1 therapy management.     Pt is here for Follow Up appointment.     Referring Provider: Anthony Read MD  PCP: Anthony Read MD   Last visit with PCP: 09/23/24   Next visit with PCP: 03/27/25      Subjective     Interval History  Initial clinical pharmacy visit was 10/23/24 and patient opted to start Zepbound 2.5 mg subcutaneous solution from [employer]. Patient's BMI was 40.44 (09/23/24) meeting criteria for Zepbound for weight loss management.      In-person visit with clinical pharmacy 11/13/24: patient education for first dose administration of Zepbound 2.5 mg subcutaneous solution to be injected under the skin once weekly. Patient demonstrated understanding of how to administer his Zepbound and was comfortable administering his first dose in-office.     Clinical pharmacy visit: 12/05/24: Patient report doing well on Zepbound 2.5 mg once weekly. Endorsed some decreased appetite and helping make better food choice to avoid GI side effects. Patient is comfortable with increasing dose to Zepbound 5 mg, however, due to some issues, patient had paid for a refill of Zepbound 2.5 mg. Agreed to have patient will remain on 2.5 mg for the next month and will follow up in 4 weeks to discuss increasing dose to Zepbound 5 mg once weekly.     HPI  WEIGHT LOSS  Starting weight: 294.6 lbs. (11/13/24)  Current weight: 295 lbs. (01/02/25)    BMI Readings from Last 3 Encounters:   09/23/24 40.44 kg/m²   08/27/24 40.28 kg/m²   07/09/24 41.51 kg/m²      Patient Reported Weights:  11/13/24: 294.6 lbs  12/05/24: 290.4 lbs  01/02/25: 295 lbs    Lifestyle - diet has been a little bad recently due to holidays  Diet: 3 meals/day. Late night cravings have been reduced  BK: banana usually  LN: light lunch  DN: protein, carb, vegetables  Snacks: Reduced the snacking, changed out ice cream for greek yogurt  Drinks: Black or  herbal tea unsweetened, water  Has worked with nutritionist/dietician? No  Physical Activity: not regularly, try to take walks, tried gym, but did not see weight loss, felt more hungry, try to hike when time allows. On feet most the week for job.  On home on winter break so a little bit more sedentary     Other Potential Contributing Factors  Alcohol use: Average 5-6 drinks/week  Tobacco use: No  Other drug use: Yes - marijuana vape use occasionally (2-3x week), helps with anxiety  Medications that may cause weight gain: mirtazapine is associated significant weight gain and increase in appetite - for depression, prescribed by another provider, per patient chart, has been taking at least since 2024  Mental health: No current concerns  Eating Disorders: Denies Hx of any eating disorder  Comorbidities: Comorbidities: anxiety, esophageal reflux, high cholesterol, and hypertension controlled with oral meds     Non-Pharmacological Therapy  Weight loss techniques attempted:  Self-directed dieting: dieting jordana  Had a gym membership but did not utilize enough to justify the cost    Relevant PMH for GLP-1 screening:  PMH of Pancreatitis? No  PMH of Retinopathy? No  PMH of MTC/MEN2? No    Pharmacological Therapy  Current Medications:   Zepbound 2.5 mg once weekly injected under the skin ()  Date last taken: 25  Doses remainin  Adherence: denies any missed doses  Adverse Effects: has been a little more nauseated towards tail-end of week, but possibly due to over-indulging in the last few weeks due to holidays    Previous Medications: N/A       Insurance coverage of weight-loss medications? No, paying out of pocket, currently getting Zepbound through [facility]      Drug Interactions  No relevant drug interactions were noted.     Medication System Management  Patient's preferred pharmacy:   [id number] [address] AT [facility] OF [address]  [address]  [facility] [address]  Phone: [phone] Fax: [phone]     [id number] [place], [place] - [address] [facility]  [address] [facility] [address]  Phone: [phone] Fax: [phone]     [facility] ZEPBOUND VIAL - [facility] [address]   [address] [place]  [facility] [address]  Phone: [phone] Fax: [phone]      Objective   Allergies   Allergen Reactions    Amoxicillin Unknown    Animal Dander Unknown    Bee Pollen Unknown    Cat Dander Other    Mold Unknown    Pollen Extracts Unknown    Tree And Shrub Pollen Unknown     Social History     Social History Narrative    Not on file      Medication Review  Current Outpatient Medications   Medication Instructions    albuterol 90 mcg/actuation inhaler 2 puffs, inhalation, Every 4 hours PRN, before exercising    amlodipine-olmesartan (Erich) 5-20 mg tablet 1 tablet, oral, Daily, Each morning For blood pressure in place of losartan    cetirizine (ZyrTEC) 10 mg tablet 1 tablet, Nightly    hydrocortisone 2.5 % cream APPLY TO SKIN FOLDS TWICE DAILY X14 DAYS THEN TWICE DAILY FOR 3 DAYS PER WEEK. MIX WITH KETOCONAZOLE    ketoconazole (NIZOral) 2 % cream APPLY TWICE DAILY FOR 2 WEEKS THEN USE 2 TIMES DAILY 3 DAYS PER WEEK. COMBINE WITH HYDROCORTISONE    methylphenidate ER (Concerta) 36 mg daily tablet One tablet 5 mornings each week    mirtazapine (REMERON) 15 mg, Nightly    montelukast (SINGULAIR) 10 mg, oral, Nightly    pantoprazole (PROTONIX) 40 mg, oral, 2 times daily, 30 minutes before breakfast and dinner    Zepbound 2.5 mg, subcutaneous, Every 7 days      Vitals  BP Readings from Last 2 Encounters:   09/26/24 136/86   08/27/24 142/84     BMI Readings from Last 1 Encounters:   09/23/24 40.44 kg/m²      Labs  A1C  Lab Results   Component Value Date    HGBA1C 5.7 (H) 10/24/2024     BMP  Lab Results   Component Value Date    CALCIUM 9.6 10/24/2024     10/24/2024    K 4.5 10/24/2024    CO2 28 10/24/2024      "10/24/2024    BUN 13 10/24/2024    CREATININE 0.94 10/24/2024    EGFR >90 10/24/2024     LFTs  Lab Results   Component Value Date    ALT 43 02/20/2024    AST 22 02/20/2024    ALKPHOS 81 02/20/2024    BILITOT 0.3 02/20/2024     FLP  Lab Results   Component Value Date    TRIG 99 10/24/2024    CHOL 206 (H) 10/24/2024    LDLF 126 (H) 03/16/2023    LDLCALC 143 (H) 10/24/2024    HDL 43.6 10/24/2024     Urine Microalbumin  No results found for: \"MICROALBCREA\"  Weight Management  Wt Readings from Last 3 Encounters:   09/23/24 135 kg (298 lb 3.2 oz)   08/27/24 135 kg (297 lb)   07/09/24 139 kg (306 lb 1.6 oz)        Assessment/Plan   Problem List Items Addressed This Visit       Class 3 severe obesity with serious comorbidity and body mass index (BMI) of 40.0 to 44.9 in adult     Current BMI: 40.0  Current regimen includes   Zepbound 2.5 mg injected under the skin once weekly (Wednesdays)    Patient's current weight reported as 295 lbs. Has gained 5 lbs since last clinical pharmacy visit.  Patient has been on 2.5 mg once weekly for an extra month due to refill/communication issues. Patient reported that he has an increase in appetite since being on the initial dose an extra month. Reported minimal side effects, mainly nausea toward the end of week after dose administration, possible due to over indulging.    Due to patient tolerating Zepbound 2.5 mg and recent weight gain due to lack of weight loss benefits, plan to INCREASE to Zepbound 5 mg once weekly. Will follow up in 4 weeks to assess tolerability, weight loss progress and dose increase if appropriate.     Medication Changes:  INCREASE  Zepbound 5 mg once weekly injected under the skin (increased from 2.5 mg)    Monitoring and Education:  Reviewed with patient with dose increase to monitor/caution for possible increase risk of GI side effects.          Relevant Orders    Referral to Clinical Pharmacy   Clinical Pharmacist follow-up: 01/30/25, Telehealth " visit    Continue all meds under the continuation of care with the referring provider and clinical pharmacy team.    Thank you,  Shena Bond (Suji), PharmD  PGY-1  Meds Pharmacy Resident     Verbal consent to manage patient's drug therapy was obtained from the patient. They were informed they may decline to participate or withdraw from participation in pharmacy services at any time.

## 2025-01-02 ENCOUNTER — APPOINTMENT (OUTPATIENT)
Dept: PHARMACY | Facility: HOSPITAL | Age: 35
End: 2025-01-02
Payer: COMMERCIAL

## 2025-01-02 DIAGNOSIS — E66.813 CLASS 3 SEVERE OBESITY WITH SERIOUS COMORBIDITY AND BODY MASS INDEX (BMI) OF 40.0 TO 44.9 IN ADULT, UNSPECIFIED OBESITY TYPE: ICD-10-CM

## 2025-01-02 DIAGNOSIS — E66.01 CLASS 3 SEVERE OBESITY WITH SERIOUS COMORBIDITY AND BODY MASS INDEX (BMI) OF 40.0 TO 44.9 IN ADULT, UNSPECIFIED OBESITY TYPE: ICD-10-CM

## 2025-01-02 RX ORDER — TIRZEPATIDE 5 MG/.5ML
5 INJECTION, SOLUTION SUBCUTANEOUS
Qty: 2 ML | Refills: 0 | Status: SHIPPED | OUTPATIENT
Start: 2025-01-02

## 2025-01-02 NOTE — ASSESSMENT & PLAN NOTE
Current BMI: 40.0  Current regimen includes   Zepbound 2.5 mg injected under the skin once weekly (Wednesdays)    Patient's current weight reported as 295 lbs. Has gained 5 lbs since last clinical pharmacy visit.  Patient has been on 2.5 mg once weekly for an extra month due to refill/communication issues. Patient reported that he has an increase in appetite since being on the initial dose an extra month. Reported minimal side effects, mainly nausea toward the end of week after dose administration, possible due to over indulging.    Due to patient tolerating Zepbound 2.5 mg and recent weight gain due to lack of weight loss benefits, plan to INCREASE to Zepbound 5 mg once weekly. Will follow up in 4 weeks to assess tolerability, weight loss progress and dose increase if appropriate.     Medication Changes:  INCREASE  Zepbound 5 mg once weekly injected under the skin (increased from 2.5 mg)    Monitoring and Education:  Reviewed with patient with dose increase to monitor/caution for possible increase risk of GI side effects.

## 2025-01-07 ENCOUNTER — APPOINTMENT (OUTPATIENT)
Dept: ALLERGY | Facility: CLINIC | Age: 35
End: 2025-01-07
Payer: COMMERCIAL

## 2025-01-07 DIAGNOSIS — J30.1 SEASONAL ALLERGIC RHINITIS DUE TO POLLEN: ICD-10-CM

## 2025-01-07 PROCEDURE — 95117 IMMUNOTHERAPY INJECTIONS: CPT | Performed by: ALLERGY & IMMUNOLOGY

## 2025-01-30 ENCOUNTER — APPOINTMENT (OUTPATIENT)
Dept: PHARMACY | Facility: HOSPITAL | Age: 35
End: 2025-01-30
Payer: COMMERCIAL

## 2025-01-30 DIAGNOSIS — E66.813 CLASS 3 SEVERE OBESITY WITH SERIOUS COMORBIDITY AND BODY MASS INDEX (BMI) OF 40.0 TO 44.9 IN ADULT, UNSPECIFIED OBESITY TYPE: ICD-10-CM

## 2025-01-30 DIAGNOSIS — E66.01 CLASS 3 SEVERE OBESITY WITH SERIOUS COMORBIDITY AND BODY MASS INDEX (BMI) OF 40.0 TO 44.9 IN ADULT, UNSPECIFIED OBESITY TYPE: ICD-10-CM

## 2025-01-30 RX ORDER — TIRZEPATIDE 5 MG/.5ML
5 INJECTION, SOLUTION SUBCUTANEOUS
Qty: 2 ML | Refills: 0 | Status: SHIPPED | OUTPATIENT
Start: 2025-01-30

## 2025-01-30 NOTE — PROGRESS NOTES
Clinical Pharmacy Appointment    Patient ID: Roosevelt Cartagena is a 34 y.o. male who presents for weight loss and GLP-1 therapy management.    Pt is here for Follow Up appointment.     Referring Provider: Anthony Read MD  PCP: Anthony Read MD   Last visit with PCP: 09/23/24   Next visit with PCP: 03/27/25      Subjective     Interval History  Clinical pharmacy visit 10/23/24: Patient opted to start Zepbound 2.5 mg subcutaneous solution from [facility]. Patient's BMI was 40.44 (09/23/24) meeting criteria for Zepbound for weight loss management.      In-person clinical pharmacy visit 11/13/24: patient education for first dose administration of Zepbound 2.5 mg subcutaneous solution to be injected under the skin once weekly. Patient demonstrated understanding of how to administer his Zepbound and was comfortable administering his first dose in-office.      Clinical pharmacy visit: 12/05/24: Patient report doing well on Zepbound 2.5 mg once weekly. Endorsed some decreased appetite and helping make better food choice to avoid GI side effects. Patient is comfortable with increasing dose to Zepbound 5 mg, however, due to some issues, patient had paid for a refill of Zepbound 2.5 mg. Agreed to have patient will remain on 2.5 mg for the next month and will follow up in 4 weeks to discuss increasing dose to Zepbound 5 mg once weekly.     HPI  WEIGHT LOSS  Starting weight: 294.6 lbs. (11/13/24)  Current weight: 282 lbs. (01/30/25)  Weight loss goal: Target weight 200 - 220 lbs.     BMI Readings from Last 5 Encounters:   09/23/24 40.44 kg/m²   08/27/24 40.28 kg/m²   07/09/24 41.51 kg/m²   03/21/24 38.44 kg/m²   02/05/24 39.11 kg/m²     Patient Reported Weights:  11/13/24: 294.6 lbs  12/05/24: 290.4 lbs  01/02/25: 295.0 lbs   01/30/25: 282.0 lbs    Lifestyle - no new changes  Diet: 3 meals/day. Late night cravings have been reduced   Breakfast: banana usually  Lunch: light lunch  Diner: protein, carb, vegetable  Snacks: Reduced  the snacking, changed out ice cream for greek yogurt   Drinks: Black or herbal tea unsweetened, water   Has worked with nutritionist/dietician? No - not interested at this time  Physical Activity:   Not regularly, try to take walks, tried gym, but did not see weight loss, felt more hungry, try to hike when time allows. On feet most the week for job.  On home on winter break so a little bit more sedentary    Other Potential Contributing Factors  Alcohol use: Average 5-6 drinks/week  Tobacco use: No  Other drug use: Yes - marijuana vape use occasionally (2-3x week), helps with anxiety  Medications that may cause weight gain: mirtazapine is associated significant weight gain and increase in appetite - for depression, prescribed by another provider, per patient chart, has been taking at least since 2024  Mental health: No current concerns  Eating Disorders: Denies Hx of any eating disorder  Comorbidities: Comorbidities: anxiety, esophageal reflux, high cholesterol, and hypertension controlled with oral meds     Non-Pharmacological Therapy  Weight loss techniques attempted:  Self-directed dieting: dieting jordana  Had a gym membership but did not utilize enough to justify the cost     Relevant PMH for GLP-1 screening:  PMH of Pancreatitis? No  PMH of Retinopathy? No  PMH of MTC/MEN2? No    Pharmacological Therapy  Current Medications:   Zepbound 5 mg once weekly injected under the skin ()  Date last taken: 25  Doses remainin  Adherence: denies  Adverse Effects:   had a little pain with injection, but went away after 10 minutes, patient not to concerned at this time   Still having decreased appetite and usual portions have gotten smaller.  Steady weight loss ~2 lbs / weeks     Previous Medications: N/A       Insurance coverage of weight-loss medications? No, paying out of pocket, currently getting Zepbound through [employer]      Drug Interactions  No relevant drug interactions were noted.      Medication System Management  Patient's preferred pharmacy:   [id number] - [place], [place] - [address] AT [facility] OF [address]  [address] [facility] [address]  Phone: [phone] Fax: [phone]     [id number] [place], [place] - [address] [facility]  [address] [facility] [address]  Phone: [phone] Fax: [phone]     [facility] ZEPBOUND VIAL - [facility] [address]   [address] [place]  [facility] [address]  Phone: [phone] Fax: [phone]      Objective   Allergies   Allergen Reactions    Amoxicillin Unknown    Animal Dander Unknown    Bee Pollen Unknown    Cat Dander Other    Mold Unknown    Pollen Extracts Unknown    Tree And Shrub Pollen Unknown     Social History     Social History Narrative    Not on file      Medication Review  Current Outpatient Medications   Medication Instructions    albuterol 90 mcg/actuation inhaler 2 puffs, inhalation, Every 4 hours PRN, before exercising    amlodipine-olmesartan (Erich) 5-20 mg tablet 1 tablet, oral, Daily, Each morning For blood pressure in place of losartan    cetirizine (ZyrTEC) 10 mg tablet 1 tablet, Nightly    hydrocortisone 2.5 % cream APPLY TO SKIN FOLDS TWICE DAILY X14 DAYS THEN TWICE DAILY FOR 3 DAYS PER WEEK. MIX WITH KETOCONAZOLE    ketoconazole (NIZOral) 2 % cream APPLY TWICE DAILY FOR 2 WEEKS THEN USE 2 TIMES DAILY 3 DAYS PER WEEK. COMBINE WITH HYDROCORTISONE    methylphenidate ER (Concerta) 36 mg daily tablet One tablet 5 mornings each week    mirtazapine (REMERON) 15 mg, Nightly    montelukast (SINGULAIR) 10 mg, oral, Nightly    pantoprazole (PROTONIX) 40 mg, oral, 2 times daily, 30 minutes before breakfast and dinner    Zepbound 5 mg, subcutaneous, Every 7 days      Vitals  BP Readings from Last 2 Encounters:   09/26/24 136/86   08/27/24 142/84     BMI Readings from Last 1 Encounters:   09/23/24 40.44 kg/m²      Labs  A1C  Lab Results   Component Value Date     "HGBA1C 5.7 (H) 10/24/2024     BMP  Lab Results   Component Value Date    CALCIUM 9.6 10/24/2024     10/24/2024    K 4.5 10/24/2024    CO2 28 10/24/2024     10/24/2024    BUN 13 10/24/2024    CREATININE 0.94 10/24/2024    EGFR >90 10/24/2024     LFTs  Lab Results   Component Value Date    ALT 43 02/20/2024    AST 22 02/20/2024    ALKPHOS 81 02/20/2024    BILITOT 0.3 02/20/2024     FLP  Lab Results   Component Value Date    TRIG 99 10/24/2024    CHOL 206 (H) 10/24/2024    LDLF 126 (H) 03/16/2023    LDLCALC 143 (H) 10/24/2024    HDL 43.6 10/24/2024     Urine Microalbumin  No results found for: \"MICROALBCREA\"  Weight Management  Wt Readings from Last 3 Encounters:   09/23/24 135 kg (298 lb 3.2 oz)   08/27/24 135 kg (297 lb)   07/09/24 139 kg (306 lb 1.6 oz)      There is no height or weight on file to calculate BMI.     Assessment/Plan   Problem List Items Addressed This Visit       Class 3 severe obesity with serious comorbidity and body mass index (BMI) of 40.0 to 44.9 in adult     Current BMI: 38.2 (calculated based on current weight)  Current regimen includes:  Zepbound 5 mg once weekly subcutaneous solution     Patient's current weight reported as 282 lbs. Has lost 12.6 lbs (4.3% of TBW) since starting therapy plan.   Patient reports doing well on Zepbound 5 mg once weekly  Reported some nausea but it was manageable, had some injection site pain, but it was self-limiting and resolved within 10 minutes.  Patient endorses still having appetite suppression and steady weight loss of ~2-3 lbs a week.     Due to patient doing well on Zepbound 5 mg once weekly with minimal side effects and experiencing continued weight loss and appetite suppression, plan to CONTINUE with Zepbound 5 mg once weekly. Patient agreeable.    Will follow up in 4 weeks to assess medication tolerance, weight loss progress, and if appropriate increase to next dose.     Medication Changes:  CONTINUE  Zepbound 5 mg once weekly injected " under the skin  New prescription sent to [facility]         Relevant Medications    tirzepatide, weight loss, (Zepbound) 5 mg/0.5 mL solution    Other Relevant Orders    Referral to Clinical Pharmacy     Clinical Pharmacist follow-up: 02/27/25, Telehealth visit    Continue all meds under the continuation of care with the referring provider and clinical pharmacy team.    Thank you,  Shena Bond (Suji), PharmD  PGY-1  Meds Pharmacy Resident     Verbal consent to manage patient's drug therapy was obtained from the patient. They were informed they may decline to participate or withdraw from participation in pharmacy services at any time.

## 2025-01-30 NOTE — ASSESSMENT & PLAN NOTE
Current BMI: 38.2 (calculated based on current weight)  Current regimen includes:  Zepbound 5 mg once weekly subcutaneous solution     Patient's current weight reported as 282 lbs. Has lost 12.6 lbs (4.3% of TBW) since starting therapy plan.   Patient reports doing well on Zepbound 5 mg once weekly  Reported some nausea but it was manageable, had some injection site pain, but it was self-limiting and resolved within 10 minutes.  Patient endorses still having appetite suppression and steady weight loss of ~2-3 lbs a week.     Due to patient doing well on Zepbound 5 mg once weekly with minimal side effects and experiencing continued weight loss and appetite suppression, plan to CONTINUE with Zepbound 5 mg once weekly. Patient agreeable.    Will follow up in 4 weeks to assess medication tolerance, weight loss progress, and if appropriate increase to next dose.     Medication Changes:  CONTINUE  Zepbound 5 mg once weekly injected under the skin  New prescription sent to [facility]

## 2025-02-04 ENCOUNTER — APPOINTMENT (OUTPATIENT)
Dept: ALLERGY | Facility: CLINIC | Age: 35
End: 2025-02-04
Payer: COMMERCIAL

## 2025-02-04 DIAGNOSIS — J30.2 SEASONAL ALLERGIES: ICD-10-CM

## 2025-02-04 PROCEDURE — 95117 IMMUNOTHERAPY INJECTIONS: CPT | Performed by: ALLERGY & IMMUNOLOGY

## 2025-02-26 NOTE — PROGRESS NOTES
Clinical Pharmacy Appointment    Patient ID: Roosevelt Cartagena is a 34 y.o. male who presents for weight loss and GLP-1 therapy management.    Pt is here for Follow Up appointment.     Referring Provider: Anthony Read MD  PCP: Anthony Read MD   Last visit with PCP: 09/23/24   Next visit with PCP: 03/27/25    Subjective     Interval History  Clinical pharmacy visit 10/23/24: Patient opted to start Zepbound 2.5 mg subcutaneous solution from [employer]. Patient's BMI was 40.44 (09/23/24) meeting criteria for Zepbound for weight loss management.      At last clinical pharmacy encounter 01/30/25: Patient has been doing well on Zepbound 5 mg with minimal side effects and experiencing continued weight loss and appetite suppression, planned to continue with Zepbound 5 mg once weekly.   Of note: Updates on out of pocket purchasing of Zepbound via vial and syringe from the : $400/month for the 2.5 mg dose or $499/month for the 5 mg, 7.5 mg and 10 mg dose. Special offer pricing for 1st fill and refills made within 45 days of prior delivery.    HPI  WEIGHT LOSS  Starting weight: 294.6 lbs. (11/13/24)  Weight loss goal: Target weight 200 - 220 lbs. (BMI 25 ~ 185 lbs)  Current weight: 278.6 lbs.     BMI Readings from Last 5 Encounters:   09/23/24 40.44 kg/m²   08/27/24 40.28 kg/m²   07/09/24 41.51 kg/m²   03/21/24 38.44 kg/m²   02/05/24 39.11 kg/m²     Patient Reported Weights:  11/13/24: 294.6 lbs  12/05/24: 290.4 lbs  01/02/25: 295.0 lbs   01/30/25: 282.0 lbs  02/27/25: 278.6 lbs    Lifestyle - had been dealing with depressive mood this past month, has not been too vigilant with diet, but this past week has been back on track  Diet: 3 meals/day. Late night cravings have been reduced   Breakfast: banana usually  Lunch: light lunch  Diner: protein, carb, vegetable  Snacks: Reduced the snacking, changed out ice cream for greek yogurt   Drinks: Black or herbal tea unsweetened, water   Has worked with  nutritionist/dietician? No - not interested at this time  Physical Activity:   Not regularly, try to take walks, tried gym, but did not see weight loss, felt more hungry, try to hike when time allows. On feet most the week for job.  On home on winter break so a little bit more sedentary    Other Potential Contributing Factors  Alcohol use: Average 5-6 drinks/week  Tobacco use: No  Other drug use: Yes - marijuana vape use occasionally (2-3x week), helps with anxiety  Medications that may cause weight gain: mirtazapine is associated significant weight gain and increase in appetite - for depression, prescribed by another provider, per patient chart, has been taking at least since 2024  Mental health: No current concerns  Eating Disorders: Denies Hx of any eating disorder  Comorbidities: Comorbidities: anxiety, esophageal reflux, high cholesterol, and hypertension controlled with oral meds     Non-Pharmacological Therapy  Weight loss techniques attempted:  Self-directed dieting: dieting jordana  Had a gym membership but did not utilize enough to justify the cost     Relevant PMH for GLP-1 screening:  PMH of Pancreatitis? No  PMH of Retinopathy? No  PMH of MTC/MEN2? No    Pharmacological Therapy  Current Medications:   Zepbound 5 mg once weekly injected under the skin ()  Date last taken: 25  Doses remainin  Adherence: denies  Adverse Effects: still some nausea with empty stomach  Still having decreased appetite, maybe portion might have increases a little bit    Previous Medications: N/A       Insurance coverage of weight-loss medications? No, paying out of pocket, currently getting Zepbound through [employer]      Drug Interactions  No relevant drug interactions were noted.     Medication System Management  Patient's preferred pharmacy:   [id number] [address] AT [facility] [address] [address]  Phone: [phone]  Fax: [phone]     [id number] [place] [place] [facility] [address] [facility]  [address] [facility] [address]  Phone: [phone] Fax: [phone]     [facility] FOR ZEPBOUND VIAL - [facility] [address]   [address] [place]  [facility] [address]  Phone: [phone] Fax: [phone]      Objective   Allergies   Allergen Reactions    Amoxicillin Unknown    Animal Dander Unknown    Bee Pollen Unknown    Cat Dander Other    Mold Unknown    Pollen Extracts Unknown    Tree And Shrub Pollen Unknown     Social History     Social History Narrative    Not on file      Medication Review  Current Outpatient Medications   Medication Instructions    albuterol 90 mcg/actuation inhaler 2 puffs, inhalation, Every 4 hours PRN, before exercising    amlodipine-olmesartan (Erich) 5-20 mg tablet 1 tablet, oral, Daily, Each morning For blood pressure in place of losartan    cetirizine (ZyrTEC) 10 mg tablet 1 tablet, Nightly    hydrocortisone 2.5 % cream APPLY TO SKIN FOLDS TWICE DAILY X14 DAYS THEN TWICE DAILY FOR 3 DAYS PER WEEK. MIX WITH KETOCONAZOLE    ketoconazole (NIZOral) 2 % cream APPLY TWICE DAILY FOR 2 WEEKS THEN USE 2 TIMES DAILY 3 DAYS PER WEEK. COMBINE WITH HYDROCORTISONE    methylphenidate ER (Concerta) 36 mg daily tablet One tablet 5 mornings each week    mirtazapine (REMERON) 15 mg, Nightly    montelukast (SINGULAIR) 10 mg, oral, Nightly    pantoprazole (PROTONIX) 40 mg, oral, 2 times daily, 30 minutes before breakfast and dinner    tirzepatide (weight loss) (ZEPBOUND) 7.5 mg, subcutaneous, Every 7 days      Vitals  BP Readings from Last 2 Encounters:   09/26/24 136/86   08/27/24 142/84     BMI Readings from Last 1 Encounters:   09/23/24 40.44 kg/m²      Labs  A1C  Lab Results   Component Value Date    HGBA1C 5.7 (H) 10/24/2024     BMP  Lab Results   Component Value Date    CALCIUM 9.6 10/24/2024     10/24/2024    K 4.5 10/24/2024    CO2 28 10/24/2024     10/24/2024    BUN 13  "10/24/2024    CREATININE 0.94 10/24/2024    EGFR >90 10/24/2024     LFTs  Lab Results   Component Value Date    ALT 43 02/20/2024    AST 22 02/20/2024    ALKPHOS 81 02/20/2024    BILITOT 0.3 02/20/2024     FLP  Lab Results   Component Value Date    TRIG 99 10/24/2024    CHOL 206 (H) 10/24/2024    LDLF 126 (H) 03/16/2023    LDLCALC 143 (H) 10/24/2024    HDL 43.6 10/24/2024     Urine Microalbumin  No results found for: \"MICROALBCREA\"  Weight Management  Wt Readings from Last 3 Encounters:   09/23/24 135 kg (298 lb 3.2 oz)   08/27/24 135 kg (297 lb)   07/09/24 139 kg (306 lb 1.6 oz)        Assessment/Plan   Problem List Items Addressed This Visit       Class 3 severe obesity with serious comorbidity and body mass index (BMI) of 40.0 to 44.9 in adult     Current BMI: 37.8 (calculated based on current weight)  Current regimen includes:  Zepbound 5 mg once weekly injected under the skin     Patient's current weight reported as 278.6 lbs. Has lost 16 lbs (5.4% of TBW) since starting therapy plan.   Denied any issues with adherence or adverse drug reactions, reported still having appetite suppression, however, weight loss seemed to have peaked at current dose.     Due to patient doing well on Zepbound 5 mg with minimal side effects and weight loss seems to have peaked at current dose, plan to INCREASE Zepbound to 7.5 mg once weekly for additional weight loss benefit.  Reviewed potential side effects with dose increase  Encouraged patient to contact clinical pharmacist if any issues or concerns should arise prior to follow up.  Will follow up in 4 weeks to assess medication tolerability and weight loss progress.    Medication Changes:  INCREASE  Zepbound 7.5 mg once weekly injected under the skin (increase from 5 mg once weekly)         Relevant Medications    tirzepatide, weight loss, (Zepbound) 7.5 mg/0.5 mL injection    Other Relevant Orders    Referral to Clinical Pharmacy     Clinical Pharmacist follow-up: 02/27/25, " Telehealth visit    Continue all meds under the continuation of care with the referring provider and clinical pharmacy team.    Thank you,  Shena Bond (Suji), PharmD  PGY-1  Meds Pharmacy Resident     Verbal consent to manage patient's drug therapy was obtained from the patient. They were informed they may decline to participate or withdraw from participation in pharmacy services at any time.

## 2025-02-27 ENCOUNTER — APPOINTMENT (OUTPATIENT)
Dept: PHARMACY | Facility: HOSPITAL | Age: 35
End: 2025-02-27
Payer: COMMERCIAL

## 2025-02-27 DIAGNOSIS — I10 ESSENTIAL HYPERTENSION WITH GOAL BLOOD PRESSURE LESS THAN 130/85: Chronic | ICD-10-CM

## 2025-02-27 DIAGNOSIS — E66.813 CLASS 3 SEVERE OBESITY WITH SERIOUS COMORBIDITY AND BODY MASS INDEX (BMI) OF 40.0 TO 44.9 IN ADULT, UNSPECIFIED OBESITY TYPE: ICD-10-CM

## 2025-02-27 DIAGNOSIS — E66.01 CLASS 3 SEVERE OBESITY WITH SERIOUS COMORBIDITY AND BODY MASS INDEX (BMI) OF 40.0 TO 44.9 IN ADULT, UNSPECIFIED OBESITY TYPE: ICD-10-CM

## 2025-02-27 NOTE — ASSESSMENT & PLAN NOTE
Current BMI: 37.8 (calculated based on current weight)  Current regimen includes:  Zepbound 5 mg once weekly injected under the skin     Patient's current weight reported as 278.6 lbs. Has lost 16 lbs (5.4% of TBW) since starting therapy plan.   Denied any issues with adherence or adverse drug reactions, reported still having appetite suppression, however, weight loss seemed to have peaked at current dose.     Due to patient doing well on Zepbound 5 mg with minimal side effects and weight loss seems to have peaked at current dose, plan to INCREASE Zepbound to 7.5 mg once weekly for additional weight loss benefit.  Reviewed potential side effects with dose increase  Encouraged patient to contact clinical pharmacist if any issues or concerns should arise prior to follow up.  Will follow up in 4 weeks to assess medication tolerability and weight loss progress.    Medication Changes:  INCREASE  Zepbound 7.5 mg once weekly injected under the skin (increase from 5 mg once weekly)

## 2025-02-28 RX ORDER — AMLODIPINE AND OLMESARTAN MEDOXOMIL 5; 20 MG/1; MG/1
1 TABLET ORAL DAILY
Qty: 90 TABLET | Refills: 3 | Status: SHIPPED | OUTPATIENT
Start: 2025-02-28 | End: 2026-02-28

## 2025-03-05 ENCOUNTER — APPOINTMENT (OUTPATIENT)
Dept: ALLERGY | Facility: CLINIC | Age: 35
End: 2025-03-05
Payer: COMMERCIAL

## 2025-03-05 DIAGNOSIS — J30.2 SEASONAL ALLERGIES: ICD-10-CM

## 2025-03-05 PROCEDURE — 95117 IMMUNOTHERAPY INJECTIONS: CPT | Performed by: ALLERGY & IMMUNOLOGY

## 2025-03-26 NOTE — PROGRESS NOTES
Clinical Pharmacy Appointment    Patient ID: Roosevelt Cartagena is a 34 y.o. male who presents for weight loss and GLP-1 therapy management.    Pt is here for Follow Up appointment.     Referring Provider: Anthony Read MD  PCP: Anthony Read MD   Last visit with PCP: 09/23/24   Next visit with PCP: 03/27/25    Subjective     Interval History  Clinical pharmacy visit 10/23/24: Patient opted to start Zepbound 2.5 mg subcutaneous solution from [facility]. Patient's BMI was 40.44 (09/23/24) meeting criteria for Zepbound for weight loss management.      At clinical pharmacy encounter 01/30/25: Patient has been doing well on Zepbound 5 mg with minimal side effects and experiencing continued weight loss and appetite suppression, planned to continue with Zepbound 5 mg once weekly.     Of note: Updates on out of pocket purchasing of Zepbound via vial and syringe from the : $400/month for the 2.5 mg dose or $499/month for the 5 mg, 7.5 mg and 10 mg dose. Special offer pricing for 1st fill and refills made within 45 days of prior delivery.    At last clinical pharmacy encounter increased Zepbound 5 mg once weekly to 7.5 mg, patient receiving vials from [employer].     HPI  WEIGHT LOSS  Starting weight: 294.6 lbs. (11/13/24)  Weight loss goal: Target weight 200 - 220 lbs. (BMI 25 ~ 185 lbs)  Current weight: 273 lbs.     BMI Readings from Last 5 Encounters:   03/27/25 37.30 kg/m²   09/23/24 40.44 kg/m²   08/27/24 40.28 kg/m²   07/09/24 41.51 kg/m²   03/21/24 38.44 kg/m²     Patient Reported Weights:  11/13/24: 294.6 lbs  12/05/24: 290.4 lbs  01/02/25: 295.0 lbs   01/30/25: 282.0 lbs  02/27/25: 278.6 lbs  03/27/25: 273 lbs.     Lifestyle - no new changes  Diet: 3 meals/day. Late night cravings have been reduced   Breakfast: banana usually  Lunch: light lunch  Diner: protein, carb, vegetable  Snacks: Reduced the snacking, changed out ice cream for greek yogurt   Drinks: Black or herbal tea unsweetened, water   Has  worked with nutritionist/dietician? No - not interested at this time  Physical Activity:   Not regularly, try to take walks, tried gym, but did not see weight loss, felt more hungry, try to hike when time allows. On feet most the week for job.  On home on winter break so a little bit more sedentary    Other Potential Contributing Factors  Alcohol use: Average 5-6 drinks/week  Tobacco use: No  Other drug use: Yes - marijuana vape use occasionally (2-3x week), helps with anxiety  Medications that may cause weight gain: mirtazapine is associated significant weight gain and increase in appetite - for depression, prescribed by another provider, per patient chart, has been taking at least since 2024  Mental health: No current concerns  Eating Disorders: Denies Hx of any eating disorder  Comorbidities: Comorbidities: anxiety, esophageal reflux, high cholesterol, and hypertension controlled with oral meds     Non-Pharmacological Therapy  Weight loss techniques attempted:  Self-directed dieting: dieting jordana  Had a gym membership but did not utilize enough to justify the cost     Relevant PMH for GLP-1 screening:  PMH of Pancreatitis? No  PMH of Retinopathy? No  PMH of MTC/MEN2? No    Pharmacological Therapy  Current Medications:   Zepbound 7.5 mg once weekly injected under the skin ()  Date last taken: 25  Doses remainin  Adherence: denies  Adverse Effects: injection site reaction ~ 2 weeks ago, itching and redness, resolved, has not had issues since. Has some more nausea the day after injection, but resolved within 24 hours.   Endorsed appetite suppression and increased feelings of fullness.    Previous Medications: N/A       Insurance coverage of weight-loss medications? No, paying out of pocket, currently getting Zepbound through [employer]      Drug Interactions  No relevant drug interactions were noted.     Medication System Management  Patient's preferred pharmacy:   [id number]  - [place], [place] - [address] [address] [address] AT [facility] [address]  [address] [facility] [address]  Phone: [phone] Fax: [phone]     [id number] [place], [place] - [address]  [address] [facility] [address]  Phone: [phone] Fax: [phone]     [facility] FOR ZEPBOUND VIAL - [facility] [address]   [address] [place]  [facility] [address]  Phone: [phone] Fax: [phone]      Objective   Allergies   Allergen Reactions    Amoxicillin Unknown    Animal Dander Unknown    Bee Pollen Unknown    Cat Dander Other    Mold Unknown    Pollen Extracts Unknown    Tree And Shrub Pollen Unknown     Social History     Social History Narrative    Not on file      Medication Review  Current Outpatient Medications   Medication Instructions    albuterol 90 mcg/actuation inhaler 2 puffs, inhalation, Every 4 hours PRN, before exercising    amlodipine-olmesartan (Erich) 5-20 mg tablet 1 tablet, oral, Daily, Each morning For blood pressure in place of losartan    cetirizine (ZyrTEC) 10 mg tablet 1 tablet, Nightly    hydrocortisone 2.5 % cream APPLY TO SKIN FOLDS TWICE DAILY X14 DAYS THEN TWICE DAILY FOR 3 DAYS PER WEEK. MIX WITH KETOCONAZOLE    ketoconazole (NIZOral) 2 % cream APPLY TWICE DAILY FOR 2 WEEKS THEN USE 2 TIMES DAILY 3 DAYS PER WEEK. COMBINE WITH HYDROCORTISONE    methylphenidate ER (Concert[employer]) 36 mg daily tablet One tablet 5 mornings each week    mirtazapine (REMERON) 30 mg, oral, Nightly, Taking 2 tablets every night    pantoprazole (PROTONIX) 40 mg, 2 times daily PRN    Zepbound 7.5 mg, subcutaneous, Every 7 days      Vitals  BP Readings from Last 2 Encounters:   03/27/25 120/86   09/26/24 136/86     BMI Readings from Last 1 Encounters:   03/27/25 37.30 kg/m²      Labs  A1C  Lab Results   Component Value Date    HGBA1C 5.7 (H) 10/24/2024     BMP  Lab Results   Component Value Date    CALCIUM 9.6 10/24/2024     10/24/2024    K 4.5 10/24/2024    CO2  "28 10/24/2024     10/24/2024    BUN 13 10/24/2024    CREATININE 0.94 10/24/2024    EGFR >90 10/24/2024     LFTs  Lab Results   Component Value Date    ALT 43 2024    AST 22 2024    ALKPHOS 81 2024    BILITOT 0.3 2024     FLP  Lab Results   Component Value Date    TRIG 99 10/24/2024    CHOL 206 (H) 10/24/2024    LDLF 126 (H) 2023    LDLCALC 143 (H) 10/24/2024    HDL 43.6 10/24/2024     Urine Microalbumin  No results found for: \"MICROALBCREA\"  Weight Management  Wt Readings from Last 3 Encounters:   25 125 kg (275 lb)   24 135 kg (298 lb 3.2 oz)   24 135 kg (297 lb)        Assessment/Plan   Problem List Items Addressed This Visit       Class 3 severe obesity with serious comorbidity and body mass index (BMI) of 40.0 to 44.9 in adult     Current BMI: 37.0 (calculated based on current weight)  Current regimen includes:  Zepbound 7.5 mg injected under the skin once weekly    Patient's current weight reported as 273 lbs. Has lost 21.6 lbs (7.3% of TBW) since starting therapy plan.   Denied any issues with adherence, had some more nausea day after injection with 7.5 mg dose, but generally would resolve within 24 hours.  Endorsed increased appetite suppression and steady weight loss.       Due to patient having minimal side effects and are manageable as well as continued steady weight loss , plan to CONTINUE with Zepbound 7.5 once weekly for an additional month to attempt to maximize weight loss on current dose. Patient agreeable.     Will follow up in 4 week to assess weight loss progress.    Of note - patient's  baby is expected around the time of next follow up, provided 1 refill in the event patient cannot meet with clinical pharmacist.    Medication Changes:  CONTINUE  Zepbound 7.5 mg injected under the skin once weekly         Relevant Medications    tirzepatide, weight loss, (Zepbound) 7.5 mg/0.5 mL solution    Other Relevant Orders    Referral to Clinical " Pharmacy     Clinical Pharmacist follow-up: 04/24/25, Telehealth visit    Continue all meds under the continuation of care with the referring provider and clinical pharmacy team.    Thank you,  Shena Bond (Suji), PharmD  PGY-1  Meds Pharmacy Resident     Verbal consent to manage patient's drug therapy was obtained from the patient. They were informed they may decline to participate or withdraw from participation in pharmacy services at any time.

## 2025-03-27 ENCOUNTER — APPOINTMENT (OUTPATIENT)
Dept: PRIMARY CARE | Facility: CLINIC | Age: 35
End: 2025-03-27
Payer: COMMERCIAL

## 2025-03-27 ENCOUNTER — TELEMEDICINE (OUTPATIENT)
Dept: PHARMACY | Facility: HOSPITAL | Age: 35
End: 2025-03-27
Payer: COMMERCIAL

## 2025-03-27 VITALS
BODY MASS INDEX: 37.25 KG/M2 | SYSTOLIC BLOOD PRESSURE: 120 MMHG | OXYGEN SATURATION: 97 % | HEART RATE: 83 BPM | WEIGHT: 275 LBS | DIASTOLIC BLOOD PRESSURE: 86 MMHG | HEIGHT: 72 IN

## 2025-03-27 DIAGNOSIS — E78.5 HYPERLIPIDEMIA, UNSPECIFIED HYPERLIPIDEMIA TYPE: ICD-10-CM

## 2025-03-27 DIAGNOSIS — R10.13 DYSPEPSIA: ICD-10-CM

## 2025-03-27 DIAGNOSIS — K21.9 GASTROESOPHAGEAL REFLUX DISEASE WITHOUT ESOPHAGITIS: ICD-10-CM

## 2025-03-27 DIAGNOSIS — E55.9 VITAMIN D DEFICIENCY: ICD-10-CM

## 2025-03-27 DIAGNOSIS — I10 ESSENTIAL HYPERTENSION WITH GOAL BLOOD PRESSURE LESS THAN 130/85: Primary | ICD-10-CM

## 2025-03-27 DIAGNOSIS — E66.813 CLASS 3 SEVERE OBESITY WITH SERIOUS COMORBIDITY AND BODY MASS INDEX (BMI) OF 40.0 TO 44.9 IN ADULT, UNSPECIFIED OBESITY TYPE: ICD-10-CM

## 2025-03-27 DIAGNOSIS — R73.01 ELEVATED FASTING GLUCOSE: ICD-10-CM

## 2025-03-27 DIAGNOSIS — E66.01 CLASS 3 SEVERE OBESITY WITH SERIOUS COMORBIDITY AND BODY MASS INDEX (BMI) OF 40.0 TO 44.9 IN ADULT, UNSPECIFIED OBESITY TYPE: ICD-10-CM

## 2025-03-27 DIAGNOSIS — E78.5 DYSLIPIDEMIA: ICD-10-CM

## 2025-03-27 PROCEDURE — 3008F BODY MASS INDEX DOCD: CPT | Performed by: INTERNAL MEDICINE

## 2025-03-27 PROCEDURE — 1036F TOBACCO NON-USER: CPT | Performed by: INTERNAL MEDICINE

## 2025-03-27 PROCEDURE — 3074F SYST BP LT 130 MM HG: CPT | Performed by: INTERNAL MEDICINE

## 2025-03-27 PROCEDURE — 3079F DIAST BP 80-89 MM HG: CPT | Performed by: INTERNAL MEDICINE

## 2025-03-27 PROCEDURE — 99213 OFFICE O/P EST LOW 20 MIN: CPT | Performed by: INTERNAL MEDICINE

## 2025-03-27 RX ORDER — PANTOPRAZOLE SODIUM 40 MG/1
40 TABLET, DELAYED RELEASE ORAL 2 TIMES DAILY PRN
Status: SHIPPED | COMMUNITY
Start: 2025-03-27

## 2025-03-27 RX ORDER — MIRTAZAPINE 15 MG/1
30 TABLET, FILM COATED ORAL NIGHTLY
Status: SHIPPED | COMMUNITY
Start: 2025-03-27

## 2025-03-27 RX ORDER — MIRTAZAPINE 15 MG/1
15 TABLET, FILM COATED ORAL NIGHTLY
Status: SHIPPED | COMMUNITY
Start: 2025-03-27 | End: 2025-03-27 | Stop reason: DRUGHIGH

## 2025-03-27 RX ORDER — TIRZEPATIDE 7.5 MG/.5ML
7.5 INJECTION, SOLUTION SUBCUTANEOUS
Qty: 2 ML | Refills: 1 | Status: SHIPPED | OUTPATIENT
Start: 2025-03-27

## 2025-03-27 NOTE — ASSESSMENT & PLAN NOTE
Current BMI: 37.0 (calculated based on current weight)  Current regimen includes:  Zepbound 7.5 mg injected under the skin once weekly    Patient's current weight reported as 273 lbs. Has lost 21.6 lbs (7.3% of TBW) since starting therapy plan.   Denied any issues with adherence, had some more nausea day after injection with 7.5 mg dose, but generally would resolve within 24 hours.  Endorsed increased appetite suppression and steady weight loss.       Due to patient having minimal side effects and are manageable as well as continued steady weight loss , plan to CONTINUE with Zepbound 7.5 once weekly for an additional month to attempt to maximize weight loss on current dose. Patient agreeable.     Will follow up in 4 week to assess weight loss progress.    Of note - patient's  baby is expected around the time of next follow up, provided 1 refill in the event patient cannot meet with clinical pharmacist.    Medication Changes:  CONTINUE  Zepbound 7.5 mg injected under the skin once weekly
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
## 2025-04-01 ENCOUNTER — APPOINTMENT (OUTPATIENT)
Dept: ALLERGY | Facility: CLINIC | Age: 35
End: 2025-04-01
Payer: COMMERCIAL

## 2025-04-01 DIAGNOSIS — J30.2 SEASONAL ALLERGIES: ICD-10-CM

## 2025-04-01 PROCEDURE — 95117 IMMUNOTHERAPY INJECTIONS: CPT | Performed by: ALLERGY & IMMUNOLOGY

## 2025-04-24 ENCOUNTER — APPOINTMENT (OUTPATIENT)
Dept: PHARMACY | Facility: HOSPITAL | Age: 35
End: 2025-04-24
Payer: COMMERCIAL

## 2025-04-24 DIAGNOSIS — E66.01 CLASS 3 SEVERE OBESITY WITH SERIOUS COMORBIDITY AND BODY MASS INDEX (BMI) OF 40.0 TO 44.9 IN ADULT, UNSPECIFIED OBESITY TYPE: ICD-10-CM

## 2025-04-24 DIAGNOSIS — E66.813 CLASS 3 SEVERE OBESITY WITH SERIOUS COMORBIDITY AND BODY MASS INDEX (BMI) OF 40.0 TO 44.9 IN ADULT, UNSPECIFIED OBESITY TYPE: ICD-10-CM

## 2025-04-24 NOTE — ASSESSMENT & PLAN NOTE
Current BMI: 36.8 (calculated based on current weight)  Current regimen includes:  Zepbound 7.5 mg once weekly     Patient's current weight reported as 271 lbs. Has lost 23.6 lbs (8.0% of TBW) since starting therapy plan.   Denied any issues with adherence and has had minimal side effects, mainly mild nausea at beginning of week of dose administration.   Endorsed some appetite suppression on current dose, but has tapered off. Does report having to be more aware to manage diet and portion sizes.     Although patient has minimal side effects and had limited weight loss on current dose, plan to CONTINUE with Zepbound 7.5 mg once weekly.   Patient is expecting his first child within the next few weeks and would like to avoid any significant adverse side effects with a dose increase with Zepbound.   Will continue with current dose for weight maintenance at this time.     Will follow up in 4 weeks to assess weight loss progress and dose increase if appropriate.     Medication Changes:  CONTINUE  Zepbound 7.5 mg once weekly  Patient has refill available through [facility]

## 2025-04-24 NOTE — PROGRESS NOTES
Clinical Pharmacy Appointment    Patient ID: Roosevelt Cartagena is a 34 y.o. male who presents for weight loss and GLP-1 therapy management.    Pt is here for Follow Up appointment.     Referring Provider: Anthony Read MD  PCP: Anthony Read MD   Last visit with PCP: 09/23/24   Next visit with PCP: 03/27/25    Subjective     Interval History  Clinical pharmacy visit 10/23/24: Patient opted to start Zepbound 2.5 mg subcutaneous solution from [facility]. Patient's BMI was 40.44 (09/23/24) meeting criteria for Zepbound for weight loss management.      At clinical pharmacy encounter 01/30/25: Patient has been doing well on Zepbound 5 mg with minimal side effects and experiencing continued weight loss and appetite suppression, planned to continue with Zepbound 5 mg once weekly.     Of note: Updates on out of pocket purchasing of Zepbound via vial and syringe from the : $400/month for the 2.5 mg dose or $499/month for the 5 mg, 7.5 mg and 10 mg dose. Special offer pricing for 1st fill and refills made within 45 days of prior delivery.    At last clinical pharmacy encounter (03/27/25) - patient had minimal side effects and were manageable. Endorsed continued steady weight loss, therefore continued with Zepbound 7.5 once weekly for an additional month.     HPI  WEIGHT LOSS  Starting weight: 294.6 lbs. (11/13/24)  Weight loss goal: Target weight 200 - 220 lbs. (BMI 25 ~ 185 lbs)  Current weight: 271 lbs.     BMI Readings from Last 5 Encounters:   03/27/25 37.30 kg/m²   09/23/24 40.44 kg/m²   08/27/24 40.28 kg/m²   07/09/24 41.51 kg/m²   03/21/24 38.44 kg/m²     Patient Reported Weights:  11/13/24: 294.6 lbs  12/05/24: 290.4 lbs  01/02/25: 295.0 lbs   01/30/25: 282.0 lbs  02/27/25: 278.6 lbs  03/27/25: 273 lbs.   04/24/25: 271 lbs.     Lifestyle  Diet: 3 meals/day - no new significant changes  Late night cravings have been reduced   Breakfast: banana usually  Lunch: light lunch  Diner: protein, carb,  vegetable  Snacks: Reduced the snacking, changed out ice cream for greek yogurt   Drinks: Black or herbal tea unsweetened, water   Has worked with nutritionist/dietician? No - not interested at this time  Physical Activity: no significant changes.   Not regularly, try to take walks, tried gym, but did not see weight loss, felt more hungry, try to hike when time allows. On feet most the week for job.  On home on winter break so a little bit more sedentary    Other Potential Contributing Factors  Alcohol use: Average 5-6 drinks/week  Tobacco use: No  Other drug use: Yes - marijuana vape use occasionally (2-3x week), helps with anxiety  Medications that may cause weight gain: mirtazapine is associated significant weight gain and increase in appetite - for depression, prescribed by another provider, per patient chart, has been taking at least since 2024  Mental health: No current concerns  Eating Disorders: Denies Hx of any eating disorder  Comorbidities: Comorbidities: anxiety, esophageal reflux, high cholesterol, and hypertension controlled with oral meds     Non-Pharmacological Therapy  Weight loss techniques attempted:  Self-directed dieting: dieting jordana  Had a gym membership but did not utilize enough to justify the cost     Relevant PMH for GLP-1 screening:  PMH of Pancreatitis? No  PMH of Retinopathy? No  PMH of MTC/MEN2? No    Pharmacological Therapy  Current Medications:   Zepbound 7.5 mg once weekly injected under the skin ()  Date last taken: 25  Doses remainin  Adherence: denies  Adverse Effects: Still has some more nausea the day after injection, but resolves within 24 hours.   Endorsed appetite suppression but has been tapering off - requires a little more awareness to limit portions.     Previous Medications: N/A       Insurance coverage of weight-loss medications? No, paying out of pocket, currently getting Zepbound through [facility]      Drug Interactions  No relevant drug  interactions were noted.     Medication System Management  Patient's preferred pharmacy:   [id number] - [place], [place] - [address] [facility] [address]  [address] [facility] [address]  Phone: [phone] Fax: [phone]     [id number] - [place], [place] - [address] [address]  [address] [facility] [address]  Phone: [phone] Fax: [phone]     [facility] - [facility] [address]   [address] [place]  [facility] [address]  Phone: [phone] Fax: [phone]      Objective   Allergies   Allergen Reactions    Amoxicillin Unknown    Animal Dander Unknown    Bee Pollen Unknown    Cat Dander Other    Mold Unknown    Pollen Extracts Unknown    Tree And Shrub Pollen Unknown     Social History     Social History Narrative    Not on file      Medication Review  Current Outpatient Medications   Medication Instructions    albuterol 90 mcg/actuation inhaler 2 puffs, inhalation, Every 4 hours PRN, before exercising    amlodipine-olmesartan (Erich) 5-20 mg tablet 1 tablet, oral, Daily, Each morning For blood pressure in place of losartan    cetirizine (ZyrTEC) 10 mg tablet 1 tablet, Nightly    hydrocortisone 2.5 % cream APPLY TO SKIN FOLDS TWICE DAILY X14 DAYS THEN TWICE DAILY FOR 3 DAYS PER WEEK. MIX WITH KETOCONAZOLE    ketoconazole (NIZOral) 2 % cream APPLY TWICE DAILY FOR 2 WEEKS THEN USE 2 TIMES DAILY 3 DAYS PER WEEK. COMBINE WITH HYDROCORTISONE    methylphenidate ER (Concerta) 36 mg daily tablet One tablet 5 mornings each week    mirtazapine (REMERON) 30 mg, oral, Nightly, Taking 2 tablets every night    pantoprazole (PROTONIX) 40 mg, 2 times daily PRN    Zepbound 7.5 mg, subcutaneous, Every 7 days      Vitals  BP Readings from Last 2 Encounters:   03/27/25 120/86   09/26/24 136/86     BMI Readings from Last 1 Encounters:   03/27/25 37.30 kg/m²      Labs  A1C  Lab Results   Component Value Date    HGBA1C 5.7 (H) 10/24/2024  "    BMP  Lab Results   Component Value Date    CALCIUM 9.6 10/24/2024     10/24/2024    K 4.5 10/24/2024    CO2 28 10/24/2024     10/24/2024    BUN 13 10/24/2024    CREATININE 0.94 10/24/2024    EGFR >90 10/24/2024     LFTs  Lab Results   Component Value Date    ALT 43 02/20/2024    AST 22 02/20/2024    ALKPHOS 81 02/20/2024    BILITOT 0.3 02/20/2024     FLP  Lab Results   Component Value Date    TRIG 99 10/24/2024    CHOL 206 (H) 10/24/2024    LDLF 126 (H) 03/16/2023    LDLCALC 143 (H) 10/24/2024    HDL 43.6 10/24/2024     Urine Microalbumin  No results found for: \"MICROALBCREA\"  Weight Management  Wt Readings from Last 3 Encounters:   03/27/25 125 kg (275 lb)   09/23/24 135 kg (298 lb 3.2 oz)   08/27/24 135 kg (297 lb)        Assessment/Plan   Problem List Items Addressed This Visit       Class 3 severe obesity with serious comorbidity and body mass index (BMI) of 40.0 to 44.9 in adult    Current BMI: 36.8 (calculated based on current weight)  Current regimen includes:  Zepbound 7.5 mg once weekly     Patient's current weight reported as 271 lbs. Has lost 23.6 lbs (8.0% of TBW) since starting therapy plan.   Denied any issues with adherence and has had minimal side effects, mainly mild nausea at beginning of week of dose administration.   Endorsed some appetite suppression on current dose, but has tapered off. Does report having to be more aware to manage diet and portion sizes.     Although patient has minimal side effects and had limited weight loss on current dose, plan to CONTINUE with Zepbound 7.5 mg once weekly.   Patient is expecting his first child within the next few weeks and would like to avoid any significant adverse side effects with a dose increase with Zepbound.   Will continue with current dose for weight maintenance at this time.     Will follow up in 4 weeks to assess weight loss progress and dose increase if appropriate.     Medication Changes:  CONTINUE  Zepbound 7.5 mg once " weekly  Patient has refill available through [facility]         Relevant Orders    Referral to Clinical Pharmacy     Clinical Pharmacist follow-up: 05/22/25, Telehealth visit    Continue all meds under the continuation of care with the referring provider and clinical pharmacy team.    Thank you,  Shena Bond (Suji), PharmD  PGY-1  Meds Pharmacy Resident     Verbal consent to manage patient's drug therapy was obtained from the patient. They were informed they may decline to participate or withdraw from participation in pharmacy services at any time.

## 2025-04-29 ENCOUNTER — APPOINTMENT (OUTPATIENT)
Dept: ALLERGY | Facility: CLINIC | Age: 35
End: 2025-04-29
Payer: COMMERCIAL

## 2025-04-29 DIAGNOSIS — J30.2 SEASONAL ALLERGIES: ICD-10-CM

## 2025-04-29 PROCEDURE — 95117 IMMUNOTHERAPY INJECTIONS: CPT | Performed by: ALLERGY & IMMUNOLOGY

## 2025-05-08 ENCOUNTER — OFFICE VISIT (OUTPATIENT)
Dept: URGENT CARE | Age: 35
End: 2025-05-08
Payer: COMMERCIAL

## 2025-05-08 ENCOUNTER — TELEPHONE (OUTPATIENT)
Dept: PRIMARY CARE | Facility: CLINIC | Age: 35
End: 2025-05-08
Payer: COMMERCIAL

## 2025-05-08 ENCOUNTER — APPOINTMENT (OUTPATIENT)
Dept: URGENT CARE | Age: 35
End: 2025-05-08
Payer: COMMERCIAL

## 2025-05-08 VITALS
OXYGEN SATURATION: 96 % | DIASTOLIC BLOOD PRESSURE: 87 MMHG | RESPIRATION RATE: 16 BRPM | HEART RATE: 67 BPM | SYSTOLIC BLOOD PRESSURE: 143 MMHG | TEMPERATURE: 97.9 F

## 2025-05-08 DIAGNOSIS — H66.002 ACUTE SUPPURATIVE OTITIS MEDIA OF LEFT EAR WITHOUT SPONTANEOUS RUPTURE OF TYMPANIC MEMBRANE, RECURRENCE NOT SPECIFIED: Primary | ICD-10-CM

## 2025-05-08 PROCEDURE — 3077F SYST BP >= 140 MM HG: CPT | Performed by: PHYSICIAN ASSISTANT

## 2025-05-08 PROCEDURE — 3079F DIAST BP 80-89 MM HG: CPT | Performed by: PHYSICIAN ASSISTANT

## 2025-05-08 PROCEDURE — 99203 OFFICE O/P NEW LOW 30 MIN: CPT | Performed by: PHYSICIAN ASSISTANT

## 2025-05-08 PROCEDURE — 1036F TOBACCO NON-USER: CPT | Performed by: PHYSICIAN ASSISTANT

## 2025-05-08 RX ORDER — DOXYCYCLINE 100 MG/1
100 CAPSULE ORAL 2 TIMES DAILY
Qty: 14 CAPSULE | Refills: 0 | Status: SHIPPED | OUTPATIENT
Start: 2025-05-08 | End: 2025-05-15

## 2025-05-08 NOTE — PROGRESS NOTES
Subjective   Patient ID: Roosevelt Cartagena is a 34 y.o. male. They present today with a chief complaint of Earache (L ear).    CC: Ear pain    HPI: Patient presenting for concerns of  left ear pain muffled hearing..  Runny nose and congestion.  No otorrhea, or tenderness with pulling the pinna.  No tinnitus, lightheadedness or dizziness.  No history of trauma to the ear, face, or neck.  No oral or dental pain.  No trismus.  No difficulty swallowing.    Past Medical History  Allergies as of 05/08/2025 - Reviewed 05/08/2025   Allergen Reaction Noted    Amoxicillin Unknown 03/15/2022    Animal dander Unknown 09/10/2022    Bee pollen Unknown 03/16/2023    Cat dander Other 03/16/2023    Mold Unknown 09/10/2022    Pollen extracts Unknown 03/16/2023    Tree and shrub pollen Unknown 09/10/2022       Prescriptions Prior to Admission[1]    Medical History[2]    Surgical History[3]     reports that he has never smoked. He has never used smokeless tobacco. He reports current alcohol use of about 5.0 - 6.0 standard drinks of alcohol per week. He reports current drug use. Drug: Marijuana.    Review of Systems  Review of Systems    After reviewing all body systems I have documented pertinent findings above in the history.  All other Systems reviewed and are negative for complaint.  Pertinent positive and negatives are listed in the above HPI.    Objective    Vitals:    05/08/25 1534   BP: 143/87   Pulse: 67   Resp: 16   Temp: 36.6 °C (97.9 °F)   SpO2: 96%     No LMP for male patient.  Physical Exam      General: Alert, oriented, and cooperative.  No acute distress.  No tripoding, nasal flaring, drooling, or stridor. Well developed, well nourished.     Skin: Skin is warm, and dry. No rashes or lesions.    Eyes: Sclera and conjunctivae normal     Ears: Left TM is positive for effusion.  Mild erythema and bulging.  No hemotympanum or rupture.  Right TM is normal.  Bilateral auricle, helix, and tragus without inflammation or erythema.  No  mastoid erythema, or tenderness.  No deformities. Right and left canal negative for erythema, or discharge.  Hearing is grossly intact bilaterally     Mouth/Throat: The pharynx is normal in appearance without tonsillar swelling or exudates.  Tonsils are equal and symmetric in size.  Uvula is midline.  No subungual edema or trismus.  No respiratory compromise, tripoding, drooling, muffled voice, angioedema, stridor, or trismus. Oral mucosa is pink and moist with good dentition. Tongue is midline    Neck: Supple.     Cardiac: Regular rate    Respiratory:  No acute respiratory distress.  Regular rate of breathing.      Procedures  Point of Care Test & Imaging Results from this visit    Imaging  No results found.    Cardiology, Vascular, and Other Imaging  No other imaging results found for the past 2 days    Diagnostic study results (if any) were reviewed by [facility].  Assessment/Plan   Allergies, medications, history, and pertinent labs/EKGs/Imaging reviewed by Saul Rodriguez PA-C.     MDM:  Exam findings positive for erythematous TM with effusion.  Loss of landmarks. Canals normal. See physical exam above. Findings consistent with otitis media. No clinical signs of menegitis, otitis externa, cellulitis, chondritis, TM rupture / ear trauma. No oral lesions or poor dentition to suggest referred dental pain. Neck is nontender, supple, and without meningeal signs. Advised symptomatic treatment with ibuprofen and/or Tylenol. Prescribed antibiotic as therapeutic treatment. Pt/family instructed to f/u with PCP withing 2-3 days, and encouraged to return if symptoms worsen or if new symptoms develop. Patient/family expressed understanding and consented to the above plan. No barriers of communication were apparent and I answered all questions.    Diagnoses  Diagnoses and all orders for this visit:  Acute suppurative otitis media of left ear without spontaneous rupture of tympanic membrane, recurrence not  specified  -     doxycycline (Vibramycin) 100 mg capsule; Take 1 capsule (100 mg) by mouth 2 times a day for 7 days. Take with at least 8 ounces (large glass) of water, do not lie down for 30 minutes after  -     Referral to ENT; Future    Medical Admin Record    Patient disposition: Home    Electronically signed by [facility]  4:14 PM         [1] (Not in a hospital admission)   [2]   Past Medical History:  Diagnosis Date    Abnormal EKG 05/11/2015    Attention-deficit hyperactivity disorder, predominantly inattentive type 08/04/2022    ADHD, predominantly inattentive type    Chest pain 05/11/2015    Chondrocostal junction syndrome (tietze) 09/26/2016    Costochondritis    Elevated blood-pressure reading, without diagnosis of hypertension 02/15/2021    Blood pressure elevated without history of HTN    Other chest pain 05/22/2015    Atypical chest pain    Other specified disorders of penis 11/15/2021    Penile bleeding    Overweight 07/30/2013    Overweight    Personal history of other diseases of the circulatory system 07/30/2013    History of essential hypertension    Personal history of other diseases of the digestive system 08/07/2019    History of gastroenteritis    Sprain of unspecified ligament of left ankle, initial encounter 11/02/2015    Left ankle sprain    Strain of unspecified muscle and tendon at ankle and foot level, left foot, initial encounter 10/30/2015    Strain of left ankle, initial encounter    Unspecified asthma, uncomplicated ([facility]) 02/11/2019    Asthmatic bronchitis    Unspecified nonsuppurative otitis media, bilateral 09/26/2016    Bilateral serous otitis media   [3]   Past Surgical History:  Procedure Laterality Date    OTHER SURGICAL HISTORY  10/20/2021    Penile frenuloplasty    OTHER SURGICAL HISTORY  10/11/2021    Fonda tooth extraction

## 2025-05-08 NOTE — PATIENT INSTRUCTIONS
Acute otitis MEDIA    Plan:    1. Discharge home.  2. Take medication as directed:     Acute otitis media (AOM) results from infection by viruses or bacteria, often as a complication of the common cold or of allergies. Although acute otitis media can occur at any age, it is most common between the ages of 3 months and 3 years. Acute otitis media often occurs during this age range because structures in the middle ear, such as the eustachian tube are immature and not functioning properly. Symptoms and treatment are similar in adults and older children. Most people with acute otitis media get better without treatment. However, because it is hard to predict whose symptoms will not lessen, some doctors treat all people with antibiotics, such as amoxicillin. Other doctors may give antibiotics only if the illness is severe or if symptoms do not lessen after 72 hours. Some experts say that older children or children aged 6 to 23 months who have acute otitis media in only one ear and that is not severe can start treatment with or without antibiotics. If antibiotics are withheld, they are given if the child is worse or does not feel better by the time 48 to 72 hours have passed since symptoms began. Pain relief is important. Acetaminophen or nonsteroidal anti-inflammatory drugs (NSAIDs), such as ibuprofen, can relieve pain. Adults may be given decongestant nasal sprays containing phenylephrine or decongestants taken by mouth such as pseudoephedrine. Antihistamines are useful for people who have allergies but not for those with colds. Decongestants and antihistamines are not helpful for children and may cause bothersome and possibly dangerous side effects, particularly in children younger than 2 years.    Ord

## 2025-05-18 NOTE — TELEPHONE ENCOUNTER
Crook RANDY;  5/8-pt called wanting to get in to see Dr. Read today or tomorrow. Pt complaining of left ear pain/infection occurring for 2 1/2 days.    Informed him Dr. Read didn't have anything this afternoon or tomorrow.   Advised go to  or [facility] care in [place].   Sound like pt was going to  in [place]    Please call pt at  [phone]

## 2025-05-21 NOTE — PROGRESS NOTES
Clinical Pharmacy Appointment    Patient ID: Roosevelt Cartagena is a 34 y.o. male who presents for weight loss and GLP-1 therapy management.    Pt is here for Follow Up appointment.     Referring Provider: Anthony Read MD  PCP: Anthony Read MD   Last visit with PCP: 09/23/24   Next visit with PCP: 03/27/25    Subjective     Interval History  Clinical pharmacy visit 10/23/24: Patient opted to start Zepbound 2.5 mg subcutaneous solution from [facility]. Patient's BMI was 40.44 (09/23/24) meeting criteria for Zepbound for weight loss management.      Of note: Updates on out of pocket purchasing of Zepbound via vial and syringe from the : $400/month for the 2.5 mg dose or $499/month for the 5 mg, 7.5 mg and 10 mg dose. Special offer pricing for 1st fill and refills made within 45 days of prior delivery.    At last clinical pharmacy encounter (04/24/25) - Patient reported minimal side effects and had limited weight loss on current dose. Continued with Zepbound 7.5 mg once weekly since patient is expecting his first child within the next few weeks and would like to avoid any significant adverse side effects with a dose increase with Zepbound.     HPI  WEIGHT LOSS  Starting weight: 294.6 lbs. (11/13/24)  Ht: 182.9 cm (72 in)  Weight loss goal: Target weight 200 - 220 lbs. (BMI 25 ~ 185 lbs)  Current weight: 268 lbs.     BMI Readings from Last 5 Encounters:   03/27/25 37.30 kg/m²   09/23/24 40.44 kg/m²   08/27/24 40.28 kg/m²   07/09/24 41.51 kg/m²   03/21/24 38.44 kg/m²     Patient Reported Weights:  11/13/24: 294.6 lbs  12/05/24: 290.4 lbs  01/02/25: 295.0 lbs   01/30/25: 282.0 lbs  02/27/25: 278.6 lbs  03/27/25: 273 lbs.   04/24/25: 271 lbs.   05/22/25: 268 lbs.    Lifestyle  Diet: 3 meals/day - no new significant changes  Late night cravings have been reduced   Breakfast: banana usually  Lunch: light lunch  Diner: protein, carb, vegetable  Snacks: Reduced the snacking, changed out ice cream for greek yogurt    Drinks: Black or herbal tea unsweetened, water   Has worked with nutritionist/dietician? No - not interested at this time  Physical Activity: no significant changes.   Not regularly, try to take walks, tried gym, but did not see weight loss, felt more hungry, try to hike when time allows. On feet most the week for job.  On home on winter break so a little bit more sedentary    Other Potential Contributing Factors  Alcohol use: Average 5-6 drinks/week  Tobacco use: No  Other drug use: Yes - marijuana vape use occasionally (2-3x week), helps with anxiety  Medications that may cause weight gain: mirtazapine is associated significant weight gain and increase in appetite - for depression, prescribed by another provider, per patient chart, has been taking at least since 2024  Mental health: No current concerns  Eating Disorders: Denies Hx of any eating disorder  Comorbidities: Comorbidities: anxiety, esophageal reflux, high cholesterol, and hypertension controlled with oral meds     Non-Pharmacological Therapy  Weight loss techniques attempted:  Self-directed dieting: dieting jordana  Had a gym membership but did not utilize enough to justify the cost     Relevant PMH for GLP-1 screening:  PMH of Pancreatitis? No  PMH of Retinopathy? No  PMH of MTC/MEN2? No    Pharmacological Therapy  Current Medications:   Zepbound 7.5 mg once weekly injected under the skin ()  Date last taken: 25  Doses remainin  Adherence: denies  Adverse Effects: denies    Previous Medications: N/A       Insurance coverage of weight-loss medications? No, paying out of pocket, currently getting Zepbound through [employer]      Drug Interactions  No relevant drug interactions were noted.     Medication System Management  Patient's preferred pharmacy:   [id number] - [address] AT [facility] [address] [address]  Phone: [phone] Fax: [phone]      [id number] - [place], [place] - [address] [facility]  [address] [facility] [address]  Phone: [phone] Fax: [phone]     [facility] - [facility] [address]   [address] [place]  [facility] [address]  Phone: [phone] Fax: [phone]      Objective   Allergies   Allergen Reactions    Amoxicillin Unknown    Animal Dander Unknown    Bee Pollen Unknown    Cat Dander Other    Mold Unknown    Pollen Extracts Unknown    Tree And Shrub Pollen Unknown     Social History     Social History Narrative    Not on file      Medication Review  Current Outpatient Medications   Medication Instructions    albuterol 90 mcg/actuation inhaler 2 puffs, inhalation, Every 4 hours PRN, before exercising    amlodipine-olmesartan (Erich) 5-20 mg tablet 1 tablet, oral, Daily, Each morning For blood pressure in place of losartan    cetirizine (ZyrTEC) 10 mg tablet 1 tablet, Nightly    hydrocortisone 2.5 % cream APPLY TO SKIN FOLDS TWICE DAILY X14 DAYS THEN TWICE DAILY FOR 3 DAYS PER WEEK. MIX WITH KETOCONAZOLE    ketoconazole (NIZOral) 2 % cream APPLY TWICE DAILY FOR 2 WEEKS THEN USE 2 TIMES DAILY 3 DAYS PER WEEK. COMBINE WITH HYDROCORTISONE    methylphenidate ER (Concerta) 36 mg daily tablet One tablet 5 mornings each week    mirtazapine (REMERON) 30 mg, oral, Nightly, Taking 2 tablets every night    pantoprazole (PROTONIX) 40 mg, 2 times daily PRN    Zepbound 10 mg, subcutaneous, Every 7 days      Vitals  BP Readings from Last 2 Encounters:   05/08/25 143/87   03/27/25 120/86     BMI Readings from Last 1 Encounters:   03/27/25 37.30 kg/m²      Labs  A1C  Lab Results   Component Value Date    HGBA1C 5.7 (H) 10/24/2024     BMP  Lab Results   Component Value Date    CALCIUM 9.6 10/24/2024     10/24/2024    K 4.5 10/24/2024    CO2 28 10/24/2024     10/24/2024    BUN 13 10/24/2024    CREATININE 0.94 10/24/2024    EGFR >90 10/24/2024     LFTs  Lab Results   Component Value Date  "   ALT 43 02/20/2024    AST 22 02/20/2024    ALKPHOS 81 02/20/2024    BILITOT 0.3 02/20/2024     FLP  Lab Results   Component Value Date    TRIG 99 10/24/2024    CHOL 206 (H) 10/24/2024    LDLF 126 (H) 03/16/2023    LDLCALC 143 (H) 10/24/2024    HDL 43.6 10/24/2024     Urine Microalbumin  No results found for: \"MICROALBCREA\"  Weight Management  Wt Readings from Last 3 Encounters:   03/27/25 125 kg (275 lb)   09/23/24 135 kg (298 lb 3.2 oz)   08/27/24 135 kg (297 lb)        Assessment/Plan   Problem List Items Addressed This Visit    None  Visit Diagnoses         Class 3 severe obesity with serious comorbidity and body mass index (BMI) of 40.0 to 44.9 in adult, unspecified obesity type        Relevant Medications    tirzepatide, weight loss, (Zepbound) 10 mg/0.5 mL solution    Other Relevant Orders    Referral to Clinical Pharmacy        Current BMI: 36.42 (calculated based on current weight)  Current regimen includes:    Zepbound 7.5 mg once weekly    Patient's current weight reported as 268 lbs. Has lost 26.6 lbs (9.0% of TBW) since starting therapy plan.   Denied any issues with adherence or adverse side effects.  Reports some appetite suppression, but generally has to be more mindful to monitor over-indulging and limit portions/snacking.   Did have some modest weight loss, but generally has peaked at current dose.     Due to patient reporting little to no side effects and weight loss has generally peaked at current dose, plan to INCREASE to Zepbound 10 mg once weekly.  Reviewed common side effects associated with dose increase and how to mitigate them.   Patient aware he can contact clinical pharmacist if any issues or concerns should arise prior to next follow up.     Will follow up in 4 weeks to assess tolerance to dose increase, weight loss progress, and dose increase if appropriate.     Medication Changes:  INCREASE  Zepbound 10 mg injected under the skin once weekly (increased from 7.5 mg)    Clinical " Pharmacist follow-up: 06/19/25, Telehealth visit  Continue all meds under the continuation of care with the referring provider and clinical pharmacy team.    Thank you,  Shena Bond (Suji), PharmD  PGY-1  Meds Pharmacy Resident     Verbal consent to manage patient's drug therapy was obtained from the patient. They were informed they may decline to participate or withdraw from participation in pharmacy services at any time.

## 2025-05-22 ENCOUNTER — APPOINTMENT (OUTPATIENT)
Dept: PHARMACY | Facility: HOSPITAL | Age: 35
End: 2025-05-22
Payer: COMMERCIAL

## 2025-05-22 DIAGNOSIS — E66.813 CLASS 3 SEVERE OBESITY WITH SERIOUS COMORBIDITY AND BODY MASS INDEX (BMI) OF 40.0 TO 44.9 IN ADULT, UNSPECIFIED OBESITY TYPE: ICD-10-CM

## 2025-05-22 RX ORDER — TIRZEPATIDE 10 MG/.5ML
10 INJECTION, SOLUTION SUBCUTANEOUS
Qty: 2 ML | Refills: 0 | Status: SHIPPED | OUTPATIENT
Start: 2025-05-22

## 2025-05-27 ENCOUNTER — APPOINTMENT (OUTPATIENT)
Dept: ALLERGY | Facility: CLINIC | Age: 35
End: 2025-05-27
Payer: COMMERCIAL

## 2025-05-27 DIAGNOSIS — J30.2 SEASONAL ALLERGIES: ICD-10-CM

## 2025-05-27 PROCEDURE — 95117 IMMUNOTHERAPY INJECTIONS: CPT | Performed by: ALLERGY & IMMUNOLOGY

## 2025-06-19 ENCOUNTER — APPOINTMENT (OUTPATIENT)
Dept: PHARMACY | Facility: HOSPITAL | Age: 35
End: 2025-06-19
Payer: COMMERCIAL

## 2025-06-19 DIAGNOSIS — E66.813 CLASS 3 SEVERE OBESITY WITH SERIOUS COMORBIDITY AND BODY MASS INDEX (BMI) OF 40.0 TO 44.9 IN ADULT, UNSPECIFIED OBESITY TYPE: ICD-10-CM

## 2025-06-19 RX ORDER — TIRZEPATIDE 10 MG/.5ML
10 INJECTION, SOLUTION SUBCUTANEOUS
Qty: 2 ML | Refills: 0 | Status: SHIPPED | OUTPATIENT
Start: 2025-06-19

## 2025-06-19 NOTE — PROGRESS NOTES
Clinical Pharmacy Appointment    Patient ID: Roosevelt Cartagena is a 34 y.o. male who presents for weight loss and GLP-1 therapy management.    Pt is here for Follow Up appointment.     Referring Provider: Anthony Read MD  PCP: Anthony Read MD   Last visit with PCP: 09/23/24   Next visit with PCP: 03/27/25    Subjective   Medication Reconciliation:  No changes made at this encounter    Drug Interactions  No relevant drug interactions were noted.     Medication System Management  Patient's preferred pharmacy:   [facility][id number] [facility] [address] AT [facility]  [address] [facility] [address]  Phone: [phone] Fax: [phone]     [facility]pharmacy #[id number] - [place], [place] - [address] [facility]  [address] [facility] [address]  Phone: [phone] Fax: [phone]     [facility] - [place] [address]  [facility] [address]  Phone: [phone] Fax: [phone]    Interval History  Clinical pharmacy visit 10/23/24: Patient opted to start Zepbound 2.5 mg subcutaneous solution from [employer]. Patient's BMI was 40.44 (09/23/24) meeting criteria for Zepbound for weight loss management.      Of note: Updates on out of pocket purchasing of Zepbound via vial and syringe from the : $400/month for the 2.5 mg dose or $499/month for the 5 mg, 7.5 mg and 10 mg dose. Special offer pricing for 1st fill and refills made within 45 days of prior delivery.    At last clinical pharmacy encounter (05/22/25) - Ppatient reporting little to no side effects and weight loss has generally peaked at current dose, therefore increased to Zepbound 10 mg once weekly.     HPI  WEIGHT LOSS  Starting weight: 294.6 lbs. (11/13/24)  Ht: 182.9 cm (72 in)  Weight loss goal: Target weight 200 - 220 lbs. (BMI 25 ~ 185 lbs)  Current weight: 268 lbs.     BMI Readings from Last 5 Encounters:   03/27/25 37.30 kg/m²   09/23/24 40.44  kg/m²   24 40.28 kg/m²   24 41.51 kg/m²   24 38.44 kg/m²     Patient Reported Weights:  24: 294.6 lbs  24: 290.4 lbs  25: 295.0 lbs   25: 282.0 lbs  25: 278.6 lbs  25: 273 lbs.   25: 271 lbs.   25: 268 lbs.  25: 266 lbs.     Lifestyle  Diet: 3 meals/day - Have been snacking more recently due to hectic schedule with  baby  Late night cravings have been reduced   Breakfast: banana usually  Lunch: light lunch  Diner: protein, carb, vegetable  Snacks: Reduced the snacking, changed out ice cream for greek yogurt   Drinks: Black or herbal tea unsweetened, water   Has worked with nutritionist/dietician? No - not interested at this time  Physical Activity: limited - dealing with changes in schedule and routine  Not regularly, try to take walks, tried gym, but did not see weight loss, felt more hungry, try to hike when time allows. On feet most the week for job.  On home on winter break so a little bit more sedentary    Other Potential Contributing Factors  Alcohol use: Average 5-6 drinks/week  Tobacco use: No  Other drug use: Yes - marijuana vape use occasionally (2-3x week), helps with anxiety  Medications that may cause weight gain: mirtazapine is associated significant weight gain and increase in appetite - for depression, prescribed by another provider, per patient chart, has been taking at least since 2024  Mental health: No current concerns  Eating Disorders: Denies Hx of any eating disorder  Comorbidities: Comorbidities: anxiety, esophageal reflux, high cholesterol, and hypertension controlled with oral meds     Non-Pharmacological Therapy  Weight loss techniques attempted:  Self-directed dieting: dieting jordana  Had a gym membership but did not utilize enough to justify the cost     Relevant PMH for GLP-1 screening:  PMH of Pancreatitis? No  PMH of Retinopathy? No  PMH of MTC/MEN2? No    Pharmacological Therapy  Current Medications:    Zepbound 10 mg once weekly injected under the skin ()  Date last taken: 25  Doses remainin  Adherence: denies  Adverse Effects: denies    Previous Medications: N/A           Objective   Allergies   Allergen Reactions    Amoxicillin Unknown    Animal Dander Unknown    Bee Pollen Unknown    Cat Dander Other    Mold Unknown    Pollen Extracts Unknown    Tree And Shrub Pollen Unknown     Social History     Social History Narrative    Not on file      Medication Review  Current Outpatient Medications   Medication Instructions    albuterol 90 mcg/actuation inhaler 2 puffs, inhalation, Every 4 hours PRN, before exercising    amlodipine-olmesartan (Erich) 5-20 mg tablet 1 tablet, oral, Daily, Each morning For blood pressure in place of losartan    cetirizine (ZyrTEC) 10 mg tablet 1 tablet, Nightly    hydrocortisone 2.5 % cream APPLY TO SKIN FOLDS TWICE DAILY X14 DAYS THEN TWICE DAILY FOR 3 DAYS PER WEEK. MIX WITH KETOCONAZOLE    ketoconazole (NIZOral) 2 % cream APPLY TWICE DAILY FOR 2 WEEKS THEN USE 2 TIMES DAILY 3 DAYS PER WEEK. COMBINE WITH HYDROCORTISONE    methylphenidate ER (Concerta) 36 mg daily tablet One tablet 5 mornings each week    mirtazapine (REMERON) 30 mg, oral, Nightly, Taking 2 tablets every night    pantoprazole (PROTONIX) 40 mg, 2 times daily PRN    Zepbound 10 mg, subcutaneous, Every 7 days      Vitals  BP Readings from Last 2 Encounters:   25 143/87   25 120/86     BMI Readings from Last 1 Encounters:   25 37.30 kg/m²      Labs  A1C  Lab Results   Component Value Date    HGBA1C 5.7 (H) 10/24/2024     BMP  Lab Results   Component Value Date    CALCIUM 9.6 10/24/2024     10/24/2024    K 4.5 10/24/2024    CO2 28 10/24/2024     10/24/2024    BUN 13 10/24/2024    CREATININE 0.94 10/24/2024    EGFR >90 10/24/2024     LFTs  Lab Results   Component Value Date    ALT 43 2024    AST 22 2024    ALKPHOS 81 2024    BILITOT 0.3 2024  "    FLP  Lab Results   Component Value Date    TRIG 99 10/24/2024    CHOL 206 (H) 10/24/2024    LDLF 126 (H) 2023    LDLCALC 143 (H) 10/24/2024    HDL 43.6 10/24/2024     Urine Microalbumin  No results found for: \"MICROALBCREA\"    Weight Management  Wt Readings from Last 3 Encounters:   25 125 kg (275 lb)   24 135 kg (298 lb 3.2 oz)   24 135 kg (297 lb)        Assessment/Plan   Problem List Items Addressed This Visit    None  Visit Diagnoses         Class 3 severe obesity with serious comorbidity and body mass index (BMI) of 40.0 to 44.9 in adult, unspecified obesity type        Relevant Medications    tirzepatide, weight loss, (Zepbound) 10 mg/0.5 mL solution    Other Relevant Orders    Referral to Clinical Pharmacy        Current BMI: 36.15 (calculated based on current weight)  Current regimen includes:    Zepbound 10 mg once weekly    Patient's current weight reported as 266 lbs. Has lost 28.6 lbs (9.7% of TBW) since starting therapy plan.   Denied any issues with adherence or adverse side effects.  Reports notable appetite suppression, but generally has to be more mindful to monitor over-indulging and limit portions/snacking, largely due to changes in schedule and routine with  infant.   Has discussed with therapist as well for strategies to help with being more mindful of stress/binge eating - felt has helped and has noticed modest progress during last week.   Did report some modest weight loss.    Due to patient reporting little to no side effects, will have patient CONTINUE with Zepbound 10 mg once weekly.   Patient feels with new strategies discussed with therapist it will aid in additional weight loss on current dose and has noticed recent progress as well. Therefore will have patient continue on current dose for an additional month.   Patient is aware he can contact clinical pharmacist if any issues or concerns should arise prior to next follow up.     Will follow up in 4 " weeks to assess weight loss progress and discuss dose increase if appropriate.     Medication Changes:  CONTINUE  Zepbound 10 mg injected under the skin once weekly    Clinical Pharmacist follow-up: 07/17/25 for Telehealth visit  Continue all meds under the continuation of care with the referring provider and clinical pharmacy team.    Thank you,  Shena Bond (Suji), PharmD  PGY-1  Meds Pharmacy Resident     Verbal consent to manage patient's drug therapy was obtained from the patient. They were informed they may decline to participate or withdraw from participation in pharmacy services at any time.

## 2025-06-20 NOTE — PROGRESS NOTES
I reviewed the progress note and agree with the resident’s findings and plans as written. Case discussed with resident.    Arnaldo Horton, BraxtonD

## 2025-06-24 ENCOUNTER — APPOINTMENT (OUTPATIENT)
Dept: ALLERGY | Facility: CLINIC | Age: 35
End: 2025-06-24
Payer: COMMERCIAL

## 2025-06-24 DIAGNOSIS — J30.2 SEASONAL ALLERGIES: ICD-10-CM

## 2025-06-24 PROCEDURE — 95117 IMMUNOTHERAPY INJECTIONS: CPT | Performed by: ALLERGY & IMMUNOLOGY

## 2025-07-17 ENCOUNTER — APPOINTMENT (OUTPATIENT)
Dept: PHARMACY | Facility: HOSPITAL | Age: 35
End: 2025-07-17
Payer: COMMERCIAL

## 2025-07-17 DIAGNOSIS — E66.813 CLASS 3 SEVERE OBESITY WITH SERIOUS COMORBIDITY AND BODY MASS INDEX (BMI) OF 40.0 TO 44.9 IN ADULT: ICD-10-CM

## 2025-07-17 DIAGNOSIS — R73.01 ELEVATED FASTING GLUCOSE: ICD-10-CM

## 2025-07-17 DIAGNOSIS — E66.813 CLASS 3 SEVERE OBESITY WITH SERIOUS COMORBIDITY AND BODY MASS INDEX (BMI) OF 40.0 TO 44.9 IN ADULT, UNSPECIFIED OBESITY TYPE: Primary | ICD-10-CM

## 2025-07-17 RX ORDER — TIRZEPATIDE 10 MG/.5ML
10 INJECTION, SOLUTION SUBCUTANEOUS
Qty: 2 ML | Refills: 0 | Status: SHIPPED | OUTPATIENT
Start: 2025-07-17

## 2025-07-17 NOTE — ASSESSMENT & PLAN NOTE
Current regimen includes Zepbound 10 mg once weekly. Starting weight was 294.6 lbs on 11/13/24 with calculated BMI of 40.0 kg/m2. Patient's current weight reported as 266 lbs. Has lost 28.6 lbs (~9.7% of TBW) since starting therapy plan. Patient notes improvement in nausea as he has continued on the 10 mg dose. Today does note that appetite suppression is not consistent, comes and goes. Weight has remained stable over the past month. Discussed with patient that 12.5 and 15 mg dose of Zepbound will be available through "[employer] Direct in August. Will continue on the 10 mg once weekly for another month.     Medication Changes:  CONTINUE  Zepbound 10 mg under the skin once weekly     Future Considerations:  As long as patient continues to tolerate the 10 mg dose of Zepbound and the 12.5 becomes available, will plan to titrate up to 12.5 mg at next follow-up    Monitoring and Education:  Will assess for any new/worsening side effects at next encounter to ensure patient is tolerating the medication well   Will obtain updated home weight to assess effectiveness of therapy   Patient will continue tracking calories using [employer] jordana, has found this to be helpful in watching what he eats   Wants to work on getting back into exercise routine before going back to work     We will follow-up in 4 weeks to see how he has done continuing on the same dose of Zepbound over the next month and discuss titrating up his dose. He was encouraged to reach out with any questions and/or concerns prior to then.

## 2025-07-17 NOTE — PROGRESS NOTES
Clinical Pharmacy Appointment    Patient ID: Roosevelt Cartagena is a 34 y.o. male who presents for weight loss and GLP-1 therapy management.    Pt is here for Follow Up appointment.     Referring Provider: Anthony Read MD  PCP: Anthony Read MD   Last visit with PCP: 03/27/2025  Next visit with PCP: 10/02/2025    Subjective     Drug Interactions  No relevant drug interactions were noted.     Medication System Management  Patient's preferred pharmacy:   [facility][id number] [facility] [address] AT [facility]  [address] [facility] [address]  Phone: [phone] Fax: [phone]    [id number] - [place], [place] - [address] [facility]  [address] [facility] [address]  Phone: [phone] Fax: [phone]    [facility] [address]   [address]  [facility] [address]  Phone: [phone] Fax: [phone]    Interval History  Clinical pharmacy visit 10/23/24: Patient opted to start Zepbound 2.5 mg subcutaneous solution from [employer]. Patient's BMI was 40.44 (09/23/24) meeting criteria for Zepbound for weight loss management.     At last clinical pharmacy encounter patient was tolerating the Zepbound well with no reported side effects. He noted continued appetite suppression and was generally more mindful monitoring over indulging and limiting portion sizes/snacking. Since he was continuing to see weight loss and appetite suppression, he was continued on the 10 mg once weekly dose.  We are following up today to see how he has done over the past month.     HPI  WEIGHT LOSS  Starting weight: 294.6 lbs. (11/13/24)  Ht: 182.9 cm (72 in)  Weight loss goal: Target weight 200 - 220 lbs. (BMI 25 ~ 185 lbs)  Current weight: 266 lbs.     BMI Readings from Last 5 Encounters:   03/27/25 37.30 kg/m²   09/23/24 40.44 kg/m²   08/27/24 40.28 kg/m²   07/09/24 41.51 kg/m²   03/21/24 38.44 kg/m²     Wt Readings from Last 4  Encounters:   25 125 kg (275 lb)   24 135 kg (298 lb 3.2 oz)   24 135 kg (297 lb)   24 139 kg (306 lb 1.6 oz)     Patient Reported Weights:  24: 294.6 lbs  24: 290.4 lbs  25: 295.0 lbs   25: 282.0 lbs  25: 278.6 lbs  25: 273 lbs.   25: 271 lbs.   25: 268 lbs.  25: 266 lbs.   25: 266 lbs     Lifestyle  Diet: 3 meals/day  Has started tracking calories and watching what he eats more - using the [employer] jordana, trying to stay under 2600 calories   Late night cravings have been reduced   Breakfast: banana usually  Lunch: light lunch  Diner: protein, carb, vegetable  Snacks: Reduced the snacking, changed out ice cream for greek yogurt   Drinks: Black or herbal tea unsweetened, water   Has worked with nutritionist/dietician? No - not interested at this time  Physical Activity:  Has a  at home and will be going back to work - wants to have a routine set by then   Previously went to [employer] but didn't feel that he saw weight loss with it  Has been trying to do more walking and hiking     Other Potential Contributing Factors  Alcohol use: Average 5-6 drinks/week  Tobacco use: No  Other drug use: Yes - marijuana vape use occasionally (2-3x week), helps with anxiety  Medications that may cause weight gain: mirtazapine is associated significant weight gain and increase in appetite - for depression, prescribed by another provider, per patient chart, has been taking at least since 2024  Mental health: No current concerns  Eating Disorders: Denies Hx of any eating disorder  Comorbidities: Comorbidities: anxiety, esophageal reflux, high cholesterol, and hypertension controlled with oral meds     Non-Pharmacological Therapy  Weight loss techniques attempted:  Self-directed dieting: dieting jordana  Had a gym membership but did not utilize enough to justify the cost     Relevant PMH for GLP-1 screening:  PMH of Pancreatitis? No  PMH of  Retinopathy? No  PMH of MTC/MEN2? No    Pharmacological Therapy  Current Medications:   Zepbound 10 mg once weekly injected under the skin ()  Doses remainin  Adverse Effects: notes nausea is improving, no other side effects reported   Notes that appetite suppression is there sometimes but not all the time     Previous Medications: None        Objective   Allergies   Allergen Reactions    Amoxicillin Unknown    Animal Dander Unknown    Bee Pollen Unknown    Cat Dander Other    Mold Unknown    Pollen Extracts Unknown    Tree And Shrub Pollen Unknown     Social History     Social History Narrative    Not on file     Medication Reconciliation:  No changes made at this encounter    Medication Review  Current Outpatient Medications   Medication Instructions    albuterol 90 mcg/actuation inhaler 2 puffs, inhalation, Every 4 hours PRN, before exercising    amlodipine-olmesartan (Erich) 5-20 mg tablet 1 tablet, oral, Daily, Each morning For blood pressure in place of losartan    cetirizine (ZyrTEC) 10 mg tablet 1 tablet, Nightly    hydrocortisone 2.5 % cream APPLY TO SKIN FOLDS TWICE DAILY X14 DAYS THEN TWICE DAILY FOR 3 DAYS PER WEEK. MIX WITH KETOCONAZOLE    ketoconazole (NIZOral) 2 % cream APPLY TWICE DAILY FOR 2 WEEKS THEN USE 2 TIMES DAILY 3 DAYS PER WEEK. COMBINE WITH HYDROCORTISONE    methylphenidate ER (Concerta) 36 mg daily tablet One tablet 5 mornings each week    mirtazapine (REMERON) 30 mg, oral, Nightly, Taking 2 tablets every night    Zepbound 10 mg, subcutaneous, Every 7 days      Vitals  BP Readings from Last 2 Encounters:   25 143/87   25 120/86     BMI Readings from Last 1 Encounters:   25 37.30 kg/m²      Labs  A1C  Lab Results   Component Value Date    HGBA1C 5.7 (H) 10/24/2024     BMP  Lab Results   Component Value Date    CALCIUM 9.6 10/24/2024     10/24/2024    K 4.5 10/24/2024    CO2 28 10/24/2024     10/24/2024    BUN 13 10/24/2024    CREATININE 0.94  "10/24/2024    EGFR >90 10/24/2024     LFTs  Lab Results   Component Value Date    ALT 43 02/20/2024    AST 22 02/20/2024    ALKPHOS 81 02/20/2024    BILITOT 0.3 02/20/2024     FLP  Lab Results   Component Value Date    TRIG 99 10/24/2024    CHOL 206 (H) 10/24/2024    LDLF 126 (H) 03/16/2023    LDLCALC 143 (H) 10/24/2024    HDL 43.6 10/24/2024     Urine Microalbumin  No results found for: \"MICROALBCREA\"    Weight Management  Wt Readings from Last 3 Encounters:   03/27/25 125 kg (275 lb)   09/23/24 135 kg (298 lb 3.2 oz)   08/27/24 135 kg (297 lb)        Assessment/Plan   Problem List Items Addressed This Visit       Class 3 severe obesity with serious comorbidity and body mass index (BMI) of 40.0 to 44.9 in adult    Current regimen includes Zepbound 10 mg once weekly. Starting weight was 294.6 lbs on 11/13/24 with calculated BMI of 40.0 kg/m2. Patient's current weight reported as 266 lbs. Has lost 28.6 lbs (~9.7% of TBW) since starting therapy plan. Patient notes improvement in nausea as he has continued on the 10 mg dose. Today does note that appetite suppression is not consistent, comes and goes. Weight has remained stable over the past month. Discussed with patient that 12.5 and 15 mg dose of Zepbound will be available through [employer] in August. Will continue on the 10 mg once weekly for another month.     Medication Changes:  CONTINUE  Zepbound 10 mg under the skin once weekly     Future Considerations:  As long as patient continues to tolerate the 10 mg dose of Zepbound and the 12.5 becomes available, will plan to titrate up to 12.5 mg at next follow-up    Monitoring and Education:  Will assess for any new/worsening side effects at next encounter to ensure patient is tolerating the medication well   Will obtain updated home weight to assess effectiveness of therapy   Patient will continue tracking calories using [employer] jordana, has found this to be helpful in watching what he eats   Wants to work on getting " back into exercise routine before going back to work     We will follow-up in 4 weeks to see how he has done continuing on the same dose of Zepbound over the next month and discuss titrating up his dose. He was encouraged to reach out with any questions and/or concerns prior to then.          Relevant Medications    tirzepatide, weight loss, (Zepbound) 10 mg/0.5 mL solution    Elevated fasting glucose    Relevant Orders    Referral to Clinical Pharmacy     Other Visit Diagnoses         Class 3 severe obesity with serious comorbidity and body mass index (BMI) of 40.0 to 44.9 in adult, unspecified obesity type    -  Primary    Relevant Medications    tirzepatide, weight loss, (Zepbound) 10 mg/0.5 mL solution    Other Relevant Orders    Referral to Clinical Pharmacy          Pharmacy Follow-Up: 08/14/2025   PCP Follow-Up: 10/02/2025    Continue all meds under the continuation of care with the referring provider and clinical pharmacy team.    Thank you,    Bharath Harper, PharmD   Clinical Pharmacist    Verbal consent to manage patient's drug therapy was obtained from the patient. They were informed they may decline to participate or withdraw from participation in pharmacy services at any time.

## 2025-07-22 ENCOUNTER — APPOINTMENT (OUTPATIENT)
Dept: ALLERGY | Facility: CLINIC | Age: 35
End: 2025-07-22
Payer: COMMERCIAL

## 2025-07-22 DIAGNOSIS — J30.2 SEASONAL ALLERGIES: ICD-10-CM

## 2025-07-22 PROCEDURE — 95117 IMMUNOTHERAPY INJECTIONS: CPT | Performed by: ALLERGY & IMMUNOLOGY

## 2025-08-04 DIAGNOSIS — J30.89 PERENNIAL ALLERGIC RHINITIS: ICD-10-CM

## 2025-08-04 DIAGNOSIS — J30.81 ANIMAL DANDER ALLERGY: Primary | ICD-10-CM

## 2025-08-04 DIAGNOSIS — J30.1 HAY FEVER: ICD-10-CM

## 2025-08-04 PROCEDURE — 95165 ANTIGEN THERAPY SERVICES: CPT | Performed by: ALLERGY & IMMUNOLOGY

## 2025-08-07 ENCOUNTER — APPOINTMENT (OUTPATIENT)
Dept: OTOLARYNGOLOGY | Facility: CLINIC | Age: 35
End: 2025-08-07
Payer: COMMERCIAL

## 2025-08-07 VITALS
DIASTOLIC BLOOD PRESSURE: 86 MMHG | HEIGHT: 72 IN | WEIGHT: 272 LBS | OXYGEN SATURATION: 95 % | TEMPERATURE: 98.2 F | SYSTOLIC BLOOD PRESSURE: 139 MMHG | BODY MASS INDEX: 36.84 KG/M2 | HEART RATE: 85 BPM

## 2025-08-07 DIAGNOSIS — H61.22 EXCESSIVE CERUMEN IN EAR CANAL, LEFT: ICD-10-CM

## 2025-08-07 DIAGNOSIS — H92.03 OTALGIA OF BOTH EARS: ICD-10-CM

## 2025-08-07 DIAGNOSIS — H93.8X3 SENSATION OF PLUGGED EAR ON BOTH SIDES: ICD-10-CM

## 2025-08-07 DIAGNOSIS — Z86.69 HISTORY OF RECURRENT EAR INFECTION: Primary | ICD-10-CM

## 2025-08-07 PROCEDURE — 99202 OFFICE O/P NEW SF 15 MIN: CPT

## 2025-08-07 PROCEDURE — 3008F BODY MASS INDEX DOCD: CPT | Performed by: NURSE PRACTITIONER

## 2025-08-07 PROCEDURE — 3079F DIAST BP 80-89 MM HG: CPT | Performed by: NURSE PRACTITIONER

## 2025-08-07 PROCEDURE — 3075F SYST BP GE 130 - 139MM HG: CPT | Performed by: NURSE PRACTITIONER

## 2025-08-07 PROCEDURE — 99213 OFFICE O/P EST LOW 20 MIN: CPT | Performed by: NURSE PRACTITIONER

## 2025-08-07 ASSESSMENT — ENCOUNTER SYMPTOMS
DIARRHEA: 0
ABDOMINAL PAIN: 0
NECK PAIN: 0
COUGH: 0
OCCASIONAL FEELINGS OF UNSTEADINESS: 0
LOSS OF SENSATION IN FEET: 0
SORE THROAT: 0
VOMITING: 0
RHINORRHEA: 0
DEPRESSION: 0
HEADACHES: 0

## 2025-08-07 ASSESSMENT — PAIN SCALES - GENERAL: PAINLEVEL_OUTOF10: 0-NO PAIN

## 2025-08-07 NOTE — PATIENT INSTRUCTIONS
Call [phone] to schedule hearing test. I will follow up with results.    Welcome to Milana Zoey's clinic. We are here to assist you through your ENT care at [facility].  Milana is a Nurse Practitioner who specializes in General ENT. This means that she specializes in taking care of patients with usual ENT issues such as nasal congestion, allergy symptoms, sinusitis, hearing loss, ear infections, ear wax removal, hoarseness, sore throat, throat infections, reflux and some swallowing issues. She also sees patients regarding dizziness and vertigo.   Kay is Milana's  and she answers the office phone from 7:30am-4pm Mon-Fri. Call [phone]. She can help you with scheduling of appointments, and general questions and information. You may need to leave a message if she is helping another patient. In this case, someone from the team will call you back the same day if you leave your message before 3pm, or the next business morning.  Milana currently sees patients at [facility] on Mondays, Wednesdays and Thursdays.  She works closely with audiologists to solve issues with hearing. She is also in very close contact with her collaborative physicians. Dr. Stevens, a surgeon who specializes in general ENT and rhinology. She also works closely with otologist (ear surgeon) Dr. Nguyễn and head and neck surgery Dr. Ontiveros.   Others who may be included in your care are dieticians, social workers, allergists, gastroenterologists, neurologists, and physical therapists. Milana will provide these referrals as needed. Please let her know if you would like to request a specific referral.  Milana makes every effort to run on time for your appointments. Therefore, if you are more than 15 minutes late unrelated to a scan or another appointment such as therapy or audiology, your appointment will need to be rescheduled for another day. We appreciate your understanding.   We look  forward to working with you to meet your healthcare goals.

## 2025-08-07 NOTE — PROGRESS NOTES
Subjective   Patient ID: Roosevelt Cartagena is a 34 y.o. male who presents for his ears.     HPI  Back in May he had an ear infection. He went to the Urgent Care and was referred here. Typically the left ear is affected. He usually gets antibiotics but they are rough for him. So he did a Neti Pot last time.     Denies hearing loss. During an infection the ears get plugged. Denies tinnitus. He has ear pain and drainage during infections, but not normally. Denies dizziness. Had vertigo years ago.  No previous ear surgery.   When he gets sick in the winter, he gets sinus infections.     I reviewed patient's past medical and surgical history.  Problem List[1]  Surgical History[2]    Review of Systems    All other systems have been reviewed and are negative for complaints except for those mentioned in history of present illness, past medical history and problem list.    Objective   Physical Exam    Constitutional: No fever, chills, weight loss or weight gain  General appearance: Appears well, well-nourished, well groomed. No acute distress.    Communication: Normal communication    Psychiatric: Oriented to person, place and time. Normal mood and affect.    Neurologic: Cranial nerves II-XII grossly intact and symmetric bilaterally.    Head and Face:  Head: Atraumatic with no masses, lesions or scarring.  Face: Normal symmetry. No scars or deformities.  TMJ: Normal, no trismus.    Eyes: Conjunctiva not edematous or erythematous. PERRLA    Right Ear: External inspection of ear with no deformity, scars, or masses. EAC is clear.  TM is intact with no sign of infection, effusion, or retraction.  No perforation seen.     Left Ear: External inspection of ear with no deformity, scars, or masses. EAC is clear.  Mild cerumen removed from the TM using the microscope and suction. TM is intact with no sign of infection, effusion, or retraction.  No perforation seen.     Nose: External inspection of nose: No nasal lesions, lacerations or  scars. Anterior rhinoscopy with limited visualization past the inferior turbinates. No tenderness on frontal or maxillary sinus palpation.    Oral Cavity/Mouth: Oral cavity and oropharynx mucosa moist and pink. No lesions or masses. Tonsils appear normal. Uvula is midline. Tongue with no masses or lesions. Tongue with good mobility. The oropharynx is clear.    Neck: Normal appearing, symmetric, trachea midline.     Cardiovascular: Examination of peripheral vascular system shows no clubbing or cyanosis.    Respiratory: No respiratory distress increased work of breathing. Inspection of the chest with symmetric chest expansion and normal respiratory effort.    Skin: No head and neck rashes.    Lymph nodes: No adenopathy.    I reviewed Dr. Christopher's previous clinic note from 7/9/2024.    Assessment/Plan   Diagnoses and all orders for this visit:  History of recurrent ear infection  -     Referral to Audiology; Future  Sensation of plugged ear on both sides  Otalgia of both ears  Excessive cerumen in ear canal, left    Mild cerumen removed from the left ear.  Reassurance given the ears look healthy. I recommend obtaining audiogram to further evaluate and rule out eustachian tube dysfunction.    The ear pain is likely due to TMJ dysfunction. For the next 2 weeks, use warm compresses 3 times daily to the joint. Follow a soft diet, avoid gum chewing and tough meats. Wear a  at night if you tend to grind your teeth. Take ibuprofen as needed for 3 days.    All questions answered to patient satisfaction.     Current Documentation Supports: [id number]  Problems: Low. 2 or more self-limited/minor problems (plugged ears, otalgia)  Data: Minimal  Risk: Low, see A&P       ROLANDO Bojorquez-CNP 08/07/25 2:24 PM        [1]   Patient Active Problem List  Diagnosis    ADHD, predominantly inattentive type    Allergic asthma (HHS-HCC)    Allergic rhinitis due to pollen    Allergy to animal dander    Dysfunction of both  eustachian tubes    Dyslipidemia    Eczema of both hands    Esophageal reflux    Essential hypertension with goal blood pressure less than 130/85    Plantar fasciitis, left    Plantar fasciitis, right    Skin tag    Allergy to mold spores    Stress at work    Erythema intertrigo    Other hypertrophic disorders of the skin    Dyspepsia    WILLIAM (generalized anxiety disorder)    Dysphonia    Annual physical exam    Class 3 severe obesity with serious comorbidity and body mass index (BMI) of 40.0 to 44.9 in adult    Elevated fasting glucose    Need for influenza vaccination    Anxiety    Seasonal allergies   [2]   Past Surgical History:  Procedure Laterality Date    OTHER SURGICAL HISTORY  10/20/2021    Penile frenuloplasty    OTHER SURGICAL HISTORY  10/11/2021    [place] tooth extraction

## 2025-08-14 ENCOUNTER — APPOINTMENT (OUTPATIENT)
Dept: PHARMACY | Facility: HOSPITAL | Age: 35
End: 2025-08-14
Payer: COMMERCIAL

## 2025-08-14 DIAGNOSIS — E66.813 CLASS 3 SEVERE OBESITY WITH SERIOUS COMORBIDITY AND BODY MASS INDEX (BMI) OF 40.0 TO 44.9 IN ADULT, UNSPECIFIED OBESITY TYPE: Primary | ICD-10-CM

## 2025-08-14 DIAGNOSIS — R73.01 ELEVATED FASTING GLUCOSE: ICD-10-CM

## 2025-08-14 DIAGNOSIS — E66.813 CLASS 3 SEVERE OBESITY WITH SERIOUS COMORBIDITY AND BODY MASS INDEX (BMI) OF 40.0 TO 44.9 IN ADULT: ICD-10-CM

## 2025-08-14 RX ORDER — MIRTAZAPINE 30 MG/1
30 TABLET, FILM COATED ORAL NIGHTLY
COMMUNITY
Start: 2025-07-23

## 2025-08-14 RX ORDER — TIRZEPATIDE 12.5 MG/.5ML
12.5 INJECTION, SOLUTION SUBCUTANEOUS
Qty: 2 ML | Refills: 0 | Status: SHIPPED | OUTPATIENT
Start: 2025-08-14

## 2025-08-19 ENCOUNTER — APPOINTMENT (OUTPATIENT)
Dept: ALLERGY | Facility: CLINIC | Age: 35
End: 2025-08-19
Payer: COMMERCIAL

## 2025-08-19 DIAGNOSIS — J30.2 SEASONAL ALLERGIES: ICD-10-CM

## 2025-08-19 PROCEDURE — 95117 IMMUNOTHERAPY INJECTIONS: CPT | Performed by: ALLERGY & IMMUNOLOGY

## 2025-08-27 ENCOUNTER — TELEPHONE (OUTPATIENT)
Dept: AUDIOLOGY | Facility: HOSPITAL | Age: 35
End: 2025-08-27
Payer: COMMERCIAL

## 2025-09-11 ENCOUNTER — APPOINTMENT (OUTPATIENT)
Dept: PHARMACY | Facility: HOSPITAL | Age: 35
End: 2025-09-11
Payer: COMMERCIAL

## 2025-09-16 ENCOUNTER — APPOINTMENT (OUTPATIENT)
Dept: ALLERGY | Facility: CLINIC | Age: 35
End: 2025-09-16
Payer: COMMERCIAL

## 2025-09-24 ENCOUNTER — APPOINTMENT (OUTPATIENT)
Dept: PRIMARY CARE | Facility: CLINIC | Age: 35
End: 2025-09-24
Payer: COMMERCIAL

## 2025-10-02 ENCOUNTER — APPOINTMENT (OUTPATIENT)
Dept: PRIMARY CARE | Facility: CLINIC | Age: 35
End: 2025-10-02
Payer: COMMERCIAL

## 2025-10-14 ENCOUNTER — APPOINTMENT (OUTPATIENT)
Dept: ALLERGY | Facility: CLINIC | Age: 35
End: 2025-10-14
Payer: COMMERCIAL

## 2025-10-22 ENCOUNTER — APPOINTMENT (OUTPATIENT)
Dept: ALLERGY | Facility: CLINIC | Age: 35
End: 2025-10-22
Payer: COMMERCIAL

## 2025-12-31 ENCOUNTER — APPOINTMENT (OUTPATIENT)
Dept: PRIMARY CARE | Facility: CLINIC | Age: 35
End: 2025-12-31
Payer: COMMERCIAL

## 2026-03-31 ENCOUNTER — APPOINTMENT (OUTPATIENT)
Dept: PRIMARY CARE | Facility: CLINIC | Age: 36
End: 2026-03-31
Payer: COMMERCIAL